# Patient Record
Sex: FEMALE | Race: WHITE | NOT HISPANIC OR LATINO | Employment: OTHER | URBAN - METROPOLITAN AREA
[De-identification: names, ages, dates, MRNs, and addresses within clinical notes are randomized per-mention and may not be internally consistent; named-entity substitution may affect disease eponyms.]

---

## 2017-01-14 ENCOUNTER — TRANSCRIBE ORDERS (OUTPATIENT)
Dept: ADMINISTRATIVE | Age: 72
End: 2017-01-14

## 2017-01-14 ENCOUNTER — APPOINTMENT (OUTPATIENT)
Dept: LAB | Age: 72
End: 2017-01-14
Payer: MEDICARE

## 2017-01-14 DIAGNOSIS — R94.5 NONSPECIFIC ABNORMAL RESULTS OF LIVER FUNCTION STUDY: Primary | ICD-10-CM

## 2017-01-14 DIAGNOSIS — R94.5 NONSPECIFIC ABNORMAL RESULTS OF LIVER FUNCTION STUDY: ICD-10-CM

## 2017-01-14 LAB
ALP SERPL-CCNC: 55 U/L (ref 46–116)
ALT SERPL W P-5'-P-CCNC: 24 U/L (ref 12–78)
AST SERPL W P-5'-P-CCNC: 17 U/L (ref 5–45)
GGT SERPL-CCNC: 16 U/L (ref 5–85)

## 2017-01-14 PROCEDURE — 84460 ALANINE AMINO (ALT) (SGPT): CPT

## 2017-01-14 PROCEDURE — 84075 ASSAY ALKALINE PHOSPHATASE: CPT

## 2017-01-14 PROCEDURE — 36415 COLL VENOUS BLD VENIPUNCTURE: CPT

## 2017-01-14 PROCEDURE — 82977 ASSAY OF GGT: CPT

## 2017-01-14 PROCEDURE — 84450 TRANSFERASE (AST) (SGOT): CPT

## 2017-04-25 ENCOUNTER — OFFICE VISIT (OUTPATIENT)
Dept: URGENT CARE | Facility: MEDICAL CENTER | Age: 72
End: 2017-04-25
Payer: MEDICARE

## 2017-04-25 PROCEDURE — 99203 OFFICE O/P NEW LOW 30 MIN: CPT

## 2017-04-25 PROCEDURE — G0463 HOSPITAL OUTPT CLINIC VISIT: HCPCS

## 2017-08-03 ENCOUNTER — TRANSCRIBE ORDERS (OUTPATIENT)
Dept: ADMINISTRATIVE | Age: 72
End: 2017-08-03

## 2017-08-03 ENCOUNTER — APPOINTMENT (OUTPATIENT)
Dept: LAB | Age: 72
End: 2017-08-03
Payer: MEDICARE

## 2017-08-03 DIAGNOSIS — R53.83 FATIGUE, UNSPECIFIED TYPE: Primary | ICD-10-CM

## 2017-08-03 DIAGNOSIS — M51.9 SCHMORL'S NODES, UNSPECIFIED REGION: ICD-10-CM

## 2017-08-03 DIAGNOSIS — E78.5 HYPERLIPIDEMIA, UNSPECIFIED HYPERLIPIDEMIA TYPE: ICD-10-CM

## 2017-08-03 DIAGNOSIS — G90.09 SPHENOPALATINE NEURALGIA: ICD-10-CM

## 2017-08-03 DIAGNOSIS — R94.5 NONSPECIFIC ABNORMAL RESULTS OF LIVER FUNCTION STUDY: ICD-10-CM

## 2017-08-03 DIAGNOSIS — E66.9 OBESITY, UNSPECIFIED: ICD-10-CM

## 2017-08-03 DIAGNOSIS — M12.9 ARTHROPATHY, UNSPECIFIED, SITE UNSPECIFIED: ICD-10-CM

## 2017-08-03 DIAGNOSIS — R53.83 FATIGUE, UNSPECIFIED TYPE: ICD-10-CM

## 2017-08-03 DIAGNOSIS — K57.32 DIVERTICULITIS OF COLON (WITHOUT MENTION OF HEMORRHAGE)(562.11): ICD-10-CM

## 2017-08-03 DIAGNOSIS — K21.9 GASTROESOPHAGEAL REFLUX DISEASE WITHOUT ESOPHAGITIS: ICD-10-CM

## 2017-08-03 DIAGNOSIS — N95.1 SYMPTOMATIC MENOPAUSAL OR FEMALE CLIMACTERIC STATES: ICD-10-CM

## 2017-08-03 LAB
ALBUMIN SERPL BCP-MCNC: 3.5 G/DL (ref 3.5–5)
ALP SERPL-CCNC: 54 U/L (ref 46–116)
ALT SERPL W P-5'-P-CCNC: 18 U/L (ref 12–78)
ANION GAP SERPL CALCULATED.3IONS-SCNC: 5 MMOL/L (ref 4–13)
AST SERPL W P-5'-P-CCNC: 23 U/L (ref 5–45)
BASOPHILS # BLD AUTO: 0.01 THOUSANDS/ΜL (ref 0–0.1)
BASOPHILS NFR BLD AUTO: 0 % (ref 0–1)
BILIRUB SERPL-MCNC: 0.63 MG/DL (ref 0.2–1)
BUN SERPL-MCNC: 11 MG/DL (ref 5–25)
CALCIUM SERPL-MCNC: 8.9 MG/DL (ref 8.3–10.1)
CHLORIDE SERPL-SCNC: 105 MMOL/L (ref 100–108)
CHOLEST SERPL-MCNC: 229 MG/DL (ref 50–200)
CO2 SERPL-SCNC: 29 MMOL/L (ref 21–32)
CREAT SERPL-MCNC: 0.67 MG/DL (ref 0.6–1.3)
EOSINOPHIL # BLD AUTO: 0.08 THOUSAND/ΜL (ref 0–0.61)
EOSINOPHIL NFR BLD AUTO: 1 % (ref 0–6)
ERYTHROCYTE [DISTWIDTH] IN BLOOD BY AUTOMATED COUNT: 13.8 % (ref 11.6–15.1)
GFR SERPL CREATININE-BSD FRML MDRD: 89 ML/MIN/1.73SQ M
GLUCOSE P FAST SERPL-MCNC: 85 MG/DL (ref 65–99)
HCT VFR BLD AUTO: 39.6 % (ref 34.8–46.1)
HDLC SERPL-MCNC: 75 MG/DL (ref 40–60)
HGB BLD-MCNC: 13.5 G/DL (ref 11.5–15.4)
LDLC SERPL CALC-MCNC: 132 MG/DL (ref 0–100)
LYMPHOCYTES # BLD AUTO: 1.23 THOUSANDS/ΜL (ref 0.6–4.47)
LYMPHOCYTES NFR BLD AUTO: 22 % (ref 14–44)
MCH RBC QN AUTO: 31.3 PG (ref 26.8–34.3)
MCHC RBC AUTO-ENTMCNC: 34.1 G/DL (ref 31.4–37.4)
MCV RBC AUTO: 92 FL (ref 82–98)
MONOCYTES # BLD AUTO: 0.58 THOUSAND/ΜL (ref 0.17–1.22)
MONOCYTES NFR BLD AUTO: 10 % (ref 4–12)
NEUTROPHILS # BLD AUTO: 3.68 THOUSANDS/ΜL (ref 1.85–7.62)
NEUTS SEG NFR BLD AUTO: 67 % (ref 43–75)
NRBC BLD AUTO-RTO: 0 /100 WBCS
PLATELET # BLD AUTO: 182 THOUSANDS/UL (ref 149–390)
PMV BLD AUTO: 12.5 FL (ref 8.9–12.7)
POTASSIUM SERPL-SCNC: 4.1 MMOL/L (ref 3.5–5.3)
PROT SERPL-MCNC: 6.5 G/DL (ref 6.4–8.2)
RBC # BLD AUTO: 4.32 MILLION/UL (ref 3.81–5.12)
SODIUM SERPL-SCNC: 139 MMOL/L (ref 136–145)
T4 FREE SERPL-MCNC: 0.91 NG/DL (ref 0.76–1.46)
TRIGL SERPL-MCNC: 108 MG/DL
TSH SERPL DL<=0.05 MIU/L-ACNC: 2.58 UIU/ML (ref 0.36–3.74)
WBC # BLD AUTO: 5.59 THOUSAND/UL (ref 4.31–10.16)

## 2017-08-03 PROCEDURE — 84443 ASSAY THYROID STIM HORMONE: CPT

## 2017-08-03 PROCEDURE — 80053 COMPREHEN METABOLIC PANEL: CPT

## 2017-08-03 PROCEDURE — 84439 ASSAY OF FREE THYROXINE: CPT

## 2017-08-03 PROCEDURE — 36415 COLL VENOUS BLD VENIPUNCTURE: CPT

## 2017-08-03 PROCEDURE — 80061 LIPID PANEL: CPT

## 2017-08-03 PROCEDURE — 85025 COMPLETE CBC W/AUTO DIFF WBC: CPT

## 2017-11-28 ENCOUNTER — GENERIC CONVERSION - ENCOUNTER (OUTPATIENT)
Dept: OTHER | Facility: OTHER | Age: 72
End: 2017-11-28

## 2017-11-28 DIAGNOSIS — Z13.820 ENCOUNTER FOR SCREENING FOR OSTEOPOROSIS: ICD-10-CM

## 2017-12-30 DIAGNOSIS — Z12.31 ENCOUNTER FOR SCREENING MAMMOGRAM FOR MALIGNANT NEOPLASM OF BREAST: ICD-10-CM

## 2018-01-12 NOTE — MISCELLANEOUS
Message   Recorded as Task   Date: 12/09/2016 03:21 PM, Created By: Cameron Jordan   Task Name: Care Coordination   Assigned To: Diomedes Vallejo   Regarding Patient: Darus Hodgkin, Status: Active   Comment:    Cameron Jordan - 09 Dec 2016 3:21 PM     TASK CREATED  Caller: Self; Care Coordination; [phone] (Home)  pt called for a mammo rx - KTM told her at her 10/2015 appt she didn't need an appt in 2016 [facility]) - just to call for an rx - she would like it mailed to her house  [address] [place] [address] Dec 2016 3:41 PM     TASK EDITED  order in and mailed to pt        Active Problems    1  Encounter for routine gynecological examination (V72 31) (Z01 419)   2  Encounter for screening mammogram for malignant neoplasm of breast (V76 12)   (Z12 31)   3  Postmenopausal disorder (627 9) (N95 9)    Current Meds   1  Estradiol 0 5 MG Oral Tablet; take 1 tablet by mouth every day; Therapy: 69TSD3707 to (Evaluate:30Nov2016)  Requested for: 06Utq0689; Last   Rx:05Pmz6587 Ordered   2  Estradiol 0 5 MG Oral Tablet; take 1 tablet by mouth every day; Therapy: 15Ugj5691 to (Evaluate:30Nov2016)  Requested for: 95Nyd6373; Last   Rx:75Pco3377 Ordered   3  Metoprolol Tartrate 25 MG Oral Tablet; Therapy: 56OQH1404 to (Last Rx:21Qyq3456)  Requested for: 37Bmg7255 Ordered    Allergies    1  No Known Drug Allergies    Plan  Encounter for screening mammogram for malignant neoplasm of breast    · * MAMMO SCREENING BILATERAL W CAD; Status:Hold For - Scheduling,Retrospective  By Protocol Authorization;  Requested for:[id number];     Signatures   Electronically signed by : Sandra Fiore, ; Dec  9 2016  3:41PM EST                       (Author)

## 2018-01-13 NOTE — MISCELLANEOUS
Message   Recorded as Task   Date: 03/30/2016 03:42 PM, Created By: Christy Boyd   Task Name: Miscellaneous   Assigned To: Diomedes Vallejo   Regarding Patient: Haylee Monday, Status: Active   CommentMarionjulisa Marchi - 30 Mar 2016 3:42 PM     TASK CREATED    pt needs us to call in at [phone] said they want medical necessity info on her Florence Asif - 30 Mar 2016 3:55 PM     TASK EDITED  estradial is approved per rociottivan at [facility], stu inman over        Active Problems    1  Encounter for routine gynecological examination (V72 31) (Z01 419)   2  Encounter for screening mammogram for malignant neoplasm of breast (V76 12)   (Z12 31)   3  Postmenopausal disorder (627 9) (N95 9)    Current Meds   1  Estradiol 0 5 MG Oral Tablet; take 1 tablet by mouth every day; Therapy: 56Ibt7440 to (Omar Ahumada)  Requested for: 82RTC6024; Last   Rx:96Pdb3690 Ordered   2  Metoprolol Tartrate 25 MG Oral Tablet; Therapy: 67TIV6335 to (Last Rx:25Nov2011)  Requested for: 25Nov2011 Ordered    Allergies    1   No Known Drug Allergies    Signatures   Electronically signed by : Augustina Serrato, ; Mar 30 2016  3:56PM EST                       (Author)

## 2018-01-22 VITALS
SYSTOLIC BLOOD PRESSURE: 144 MMHG | DIASTOLIC BLOOD PRESSURE: 86 MMHG | HEIGHT: 67 IN | WEIGHT: 184 LBS | BODY MASS INDEX: 28.88 KG/M2

## 2018-01-26 ENCOUNTER — HOSPITAL ENCOUNTER (OUTPATIENT)
Dept: RADIOLOGY | Age: 73
Discharge: HOME | End: 2018-01-26
Payer: MEDICARE

## 2018-01-26 DIAGNOSIS — Z12.31 ENCOUNTER FOR SCREENING MAMMOGRAM FOR MALIGNANT NEOPLASM OF BREAST: ICD-10-CM

## 2018-01-26 PROCEDURE — 77067 SCR MAMMO BI INCL CAD: CPT

## 2018-09-12 ENCOUNTER — TRANSCRIBE ORDERS (OUTPATIENT)
Dept: ADMINISTRATIVE | Age: 73
End: 2018-09-12

## 2018-09-12 ENCOUNTER — APPOINTMENT (OUTPATIENT)
Dept: LAB | Age: 73
End: 2018-09-12
Payer: MEDICARE

## 2018-09-12 DIAGNOSIS — R53.82 CHRONIC FATIGUE SYNDROME: ICD-10-CM

## 2018-09-12 DIAGNOSIS — E66.9 CLASS 1 OBESITY IN ADULT, UNSPECIFIED BMI, UNSPECIFIED OBESITY TYPE, UNSPECIFIED WHETHER SERIOUS COMORBIDITY PRESENT: ICD-10-CM

## 2018-09-12 DIAGNOSIS — I49.8 NODAL RHYTHM DISORDER: ICD-10-CM

## 2018-09-12 DIAGNOSIS — G90.09 SPHENOPALATINE NEURALGIA: ICD-10-CM

## 2018-09-12 DIAGNOSIS — R94.5 NONSPECIFIC ABNORMAL RESULTS OF LIVER FUNCTION STUDY: ICD-10-CM

## 2018-09-12 DIAGNOSIS — M51.9 INTERVERTEBRAL DISC DISORDER: ICD-10-CM

## 2018-09-12 DIAGNOSIS — N95.1 SYMPTOMATIC MENOPAUSAL OR FEMALE CLIMACTERIC STATES: ICD-10-CM

## 2018-09-12 DIAGNOSIS — E87.2 ACIDOSIS: ICD-10-CM

## 2018-09-12 DIAGNOSIS — E87.2 ACIDOSIS: Primary | ICD-10-CM

## 2018-09-12 LAB
ALBUMIN SERPL BCP-MCNC: 3.7 G/DL (ref 3.5–5)
ALP SERPL-CCNC: 66 U/L (ref 46–116)
ALT SERPL W P-5'-P-CCNC: 19 U/L (ref 12–78)
ANION GAP SERPL CALCULATED.3IONS-SCNC: 6 MMOL/L (ref 4–13)
AST SERPL W P-5'-P-CCNC: 18 U/L (ref 5–45)
BASOPHILS # BLD AUTO: 0.02 THOUSANDS/ΜL (ref 0–0.1)
BASOPHILS NFR BLD AUTO: 0 % (ref 0–1)
BILIRUB SERPL-MCNC: 0.62 MG/DL (ref 0.2–1)
BUN SERPL-MCNC: 10 MG/DL (ref 5–25)
CALCIUM SERPL-MCNC: 8.9 MG/DL (ref 8.3–10.1)
CHLORIDE SERPL-SCNC: 105 MMOL/L (ref 100–108)
CHOLEST SERPL-MCNC: 250 MG/DL (ref 50–200)
CO2 SERPL-SCNC: 27 MMOL/L (ref 21–32)
CREAT SERPL-MCNC: 0.68 MG/DL (ref 0.6–1.3)
EOSINOPHIL # BLD AUTO: 0.12 THOUSAND/ΜL (ref 0–0.61)
EOSINOPHIL NFR BLD AUTO: 2 % (ref 0–6)
ERYTHROCYTE [DISTWIDTH] IN BLOOD BY AUTOMATED COUNT: 13.3 % (ref 11.6–15.1)
GFR SERPL CREATININE-BSD FRML MDRD: 88 ML/MIN/1.73SQ M
GLUCOSE P FAST SERPL-MCNC: 89 MG/DL (ref 65–99)
HCT VFR BLD AUTO: 42.6 % (ref 34.8–46.1)
HDLC SERPL-MCNC: 71 MG/DL (ref 40–60)
HGB BLD-MCNC: 13.7 G/DL (ref 11.5–15.4)
IMM GRANULOCYTES # BLD AUTO: 0.01 THOUSAND/UL (ref 0–0.2)
IMM GRANULOCYTES NFR BLD AUTO: 0 % (ref 0–2)
LDLC SERPL CALC-MCNC: 162 MG/DL (ref 0–100)
LYMPHOCYTES # BLD AUTO: 1.32 THOUSANDS/ΜL (ref 0.6–4.47)
LYMPHOCYTES NFR BLD AUTO: 25 % (ref 14–44)
MCH RBC QN AUTO: 30.1 PG (ref 26.8–34.3)
MCHC RBC AUTO-ENTMCNC: 32.2 G/DL (ref 31.4–37.4)
MCV RBC AUTO: 94 FL (ref 82–98)
MONOCYTES # BLD AUTO: 0.66 THOUSAND/ΜL (ref 0.17–1.22)
MONOCYTES NFR BLD AUTO: 12 % (ref 4–12)
NEUTROPHILS # BLD AUTO: 3.22 THOUSANDS/ΜL (ref 1.85–7.62)
NEUTS SEG NFR BLD AUTO: 61 % (ref 43–75)
NONHDLC SERPL-MCNC: 179 MG/DL
NRBC BLD AUTO-RTO: 0 /100 WBCS
PLATELET # BLD AUTO: 170 THOUSANDS/UL (ref 149–390)
PMV BLD AUTO: 12.9 FL (ref 8.9–12.7)
POTASSIUM SERPL-SCNC: 4 MMOL/L (ref 3.5–5.3)
PROT SERPL-MCNC: 6.9 G/DL (ref 6.4–8.2)
RBC # BLD AUTO: 4.55 MILLION/UL (ref 3.81–5.12)
SODIUM SERPL-SCNC: 138 MMOL/L (ref 136–145)
T4 FREE SERPL-MCNC: 0.81 NG/DL (ref 0.76–1.46)
TRIGL SERPL-MCNC: 84 MG/DL
TSH SERPL DL<=0.05 MIU/L-ACNC: 3.04 UIU/ML (ref 0.36–3.74)
WBC # BLD AUTO: 5.35 THOUSAND/UL (ref 4.31–10.16)

## 2018-09-12 PROCEDURE — 80061 LIPID PANEL: CPT

## 2018-09-12 PROCEDURE — 84443 ASSAY THYROID STIM HORMONE: CPT

## 2018-09-12 PROCEDURE — 85025 COMPLETE CBC W/AUTO DIFF WBC: CPT

## 2018-09-12 PROCEDURE — 36415 COLL VENOUS BLD VENIPUNCTURE: CPT

## 2018-09-12 PROCEDURE — 84439 ASSAY OF FREE THYROXINE: CPT

## 2018-09-12 PROCEDURE — 80053 COMPREHEN METABOLIC PANEL: CPT

## 2018-12-03 ENCOUNTER — TRANSCRIBE ORDERS (OUTPATIENT)
Dept: ADMINISTRATIVE | Facility: HOSPITAL | Age: 73
End: 2018-12-03

## 2018-12-03 DIAGNOSIS — M25.562 ACUTE PAIN OF LEFT KNEE: Primary | ICD-10-CM

## 2018-12-04 DIAGNOSIS — Z78.0 MENOPAUSE: Primary | ICD-10-CM

## 2018-12-04 RX ORDER — ESTRADIOL 0.5 MG/1
0.5 TABLET ORAL DAILY
Qty: 90 TABLET | Refills: 1 | Status: SHIPPED | OUTPATIENT
Start: 2018-12-04 | End: 2019-08-20 | Stop reason: SDUPTHER

## 2018-12-04 NOTE — TELEPHONE ENCOUNTER
Pt called req refil on estradiol and new diego slip  Shira Ortiz  Apt not due until next year  Order placed in epic for diego and rx

## 2018-12-07 ENCOUNTER — HOSPITAL ENCOUNTER (OUTPATIENT)
Dept: RADIOLOGY | Age: 73
Discharge: HOME | End: 2018-12-07
Payer: MEDICARE

## 2018-12-07 DIAGNOSIS — M25.562 ACUTE PAIN OF LEFT KNEE: ICD-10-CM

## 2018-12-07 PROCEDURE — 73721 MRI JNT OF LWR EXTRE W/O DYE: CPT

## 2019-02-22 ENCOUNTER — HOSPITAL ENCOUNTER (OUTPATIENT)
Dept: RADIOLOGY | Age: 74
Discharge: HOME | End: 2019-02-22
Payer: MEDICARE

## 2019-02-22 VITALS — BODY MASS INDEX: 29.19 KG/M2 | HEIGHT: 67 IN | WEIGHT: 186 LBS

## 2019-02-22 DIAGNOSIS — Z78.0 MENOPAUSE: ICD-10-CM

## 2019-02-22 PROCEDURE — 77067 SCR MAMMO BI INCL CAD: CPT

## 2019-03-11 ENCOUNTER — TELEPHONE (OUTPATIENT)
Dept: OBGYN CLINIC | Facility: CLINIC | Age: 74
End: 2019-03-11

## 2019-08-20 DIAGNOSIS — Z78.0 MENOPAUSE: ICD-10-CM

## 2019-08-20 RX ORDER — ESTRADIOL 0.5 MG/1
0.5 TABLET ORAL DAILY
Qty: 90 TABLET | Refills: 1 | Status: SHIPPED | OUTPATIENT
Start: 2019-08-20 | End: 2020-03-18 | Stop reason: SDUPTHER

## 2019-08-30 ENCOUNTER — LAB (OUTPATIENT)
Dept: LAB | Facility: CLINIC | Age: 74
End: 2019-08-30
Payer: MEDICARE

## 2019-08-30 ENCOUNTER — TRANSCRIBE ORDERS (OUTPATIENT)
Dept: LAB | Facility: CLINIC | Age: 74
End: 2019-08-30

## 2019-08-30 DIAGNOSIS — M51.9 INTERVERTEBRAL DISC DISORDER: ICD-10-CM

## 2019-08-30 DIAGNOSIS — E87.2 ACIDOSIS: ICD-10-CM

## 2019-08-30 DIAGNOSIS — K21.9 GASTROESOPHAGEAL REFLUX DISEASE, ESOPHAGITIS PRESENCE NOT SPECIFIED: ICD-10-CM

## 2019-08-30 DIAGNOSIS — F01.50 VASCULAR DEMENTIA WITH DEPRESSED MOOD (HCC): ICD-10-CM

## 2019-08-30 DIAGNOSIS — I10 ESSENTIAL HYPERTENSION, MALIGNANT: ICD-10-CM

## 2019-08-30 DIAGNOSIS — I49.9 VENTRICULAR ARRHYTHMIA: ICD-10-CM

## 2019-08-30 DIAGNOSIS — R94.5 NONSPECIFIC ABNORMAL RESULTS OF LIVER FUNCTION STUDY: ICD-10-CM

## 2019-08-30 DIAGNOSIS — R53.83 OTHER FATIGUE: ICD-10-CM

## 2019-08-30 DIAGNOSIS — E66.9 OBESITY, UNSPECIFIED CLASSIFICATION, UNSPECIFIED OBESITY TYPE, UNSPECIFIED WHETHER SERIOUS COMORBIDITY PRESENT: ICD-10-CM

## 2019-08-30 DIAGNOSIS — E87.2 ACIDOSIS: Primary | ICD-10-CM

## 2019-08-30 DIAGNOSIS — F32.A VASCULAR DEMENTIA WITH DEPRESSED MOOD (HCC): ICD-10-CM

## 2019-08-30 DIAGNOSIS — R55 SYNCOPE AND COLLAPSE: ICD-10-CM

## 2019-08-30 DIAGNOSIS — G90.09 SPHENOPALATINE NEURALGIA: ICD-10-CM

## 2019-08-30 DIAGNOSIS — M12.9 ACUTE ARTHROPATHY: ICD-10-CM

## 2019-08-30 DIAGNOSIS — N95.1 SYMPTOMATIC MENOPAUSAL OR FEMALE CLIMACTERIC STATES: ICD-10-CM

## 2019-08-30 LAB
ALBUMIN SERPL BCP-MCNC: 3.8 G/DL (ref 3.5–5)
ALP SERPL-CCNC: 56 U/L (ref 46–116)
ALT SERPL W P-5'-P-CCNC: 27 U/L (ref 12–78)
ANION GAP SERPL CALCULATED.3IONS-SCNC: 6 MMOL/L (ref 4–13)
AST SERPL W P-5'-P-CCNC: 16 U/L (ref 5–45)
BASOPHILS # BLD AUTO: 0.01 THOUSANDS/ΜL (ref 0–0.1)
BASOPHILS NFR BLD AUTO: 0 % (ref 0–1)
BILIRUB SERPL-MCNC: 0.73 MG/DL (ref 0.2–1)
BUN SERPL-MCNC: 13 MG/DL (ref 5–25)
CALCIUM SERPL-MCNC: 8.9 MG/DL (ref 8.3–10.1)
CHLORIDE SERPL-SCNC: 105 MMOL/L (ref 100–108)
CHOLEST SERPL-MCNC: 275 MG/DL (ref 50–200)
CO2 SERPL-SCNC: 27 MMOL/L (ref 21–32)
CREAT SERPL-MCNC: 0.67 MG/DL (ref 0.6–1.3)
EOSINOPHIL # BLD AUTO: 0.09 THOUSAND/ΜL (ref 0–0.61)
EOSINOPHIL NFR BLD AUTO: 2 % (ref 0–6)
ERYTHROCYTE [DISTWIDTH] IN BLOOD BY AUTOMATED COUNT: 13.8 % (ref 11.6–15.1)
GFR SERPL CREATININE-BSD FRML MDRD: 88 ML/MIN/1.73SQ M
GLUCOSE P FAST SERPL-MCNC: 86 MG/DL (ref 65–99)
HCT VFR BLD AUTO: 42.1 % (ref 34.8–46.1)
HDLC SERPL-MCNC: 76 MG/DL (ref 40–60)
HGB BLD-MCNC: 13.6 G/DL (ref 11.5–15.4)
IMM GRANULOCYTES # BLD AUTO: 0.01 THOUSAND/UL (ref 0–0.2)
IMM GRANULOCYTES NFR BLD AUTO: 0 % (ref 0–2)
LDLC SERPL CALC-MCNC: 181 MG/DL (ref 0–100)
LYMPHOCYTES # BLD AUTO: 1.3 THOUSANDS/ΜL (ref 0.6–4.47)
LYMPHOCYTES NFR BLD AUTO: 30 % (ref 14–44)
MCH RBC QN AUTO: 30.2 PG (ref 26.8–34.3)
MCHC RBC AUTO-ENTMCNC: 32.3 G/DL (ref 31.4–37.4)
MCV RBC AUTO: 94 FL (ref 82–98)
MONOCYTES # BLD AUTO: 0.63 THOUSAND/ΜL (ref 0.17–1.22)
MONOCYTES NFR BLD AUTO: 14 % (ref 4–12)
NEUTROPHILS # BLD AUTO: 2.37 THOUSANDS/ΜL (ref 1.85–7.62)
NEUTS SEG NFR BLD AUTO: 54 % (ref 43–75)
NONHDLC SERPL-MCNC: 199 MG/DL
NRBC BLD AUTO-RTO: 0 /100 WBCS
PLATELET # BLD AUTO: 157 THOUSANDS/UL (ref 149–390)
PMV BLD AUTO: 12.8 FL (ref 8.9–12.7)
POTASSIUM SERPL-SCNC: 3.9 MMOL/L (ref 3.5–5.3)
PROT SERPL-MCNC: 7 G/DL (ref 6.4–8.2)
RBC # BLD AUTO: 4.5 MILLION/UL (ref 3.81–5.12)
SODIUM SERPL-SCNC: 138 MMOL/L (ref 136–145)
T4 FREE SERPL-MCNC: 0.81 NG/DL (ref 0.76–1.46)
TRIGL SERPL-MCNC: 89 MG/DL
TSH SERPL DL<=0.05 MIU/L-ACNC: 3.73 UIU/ML (ref 0.36–3.74)
WBC # BLD AUTO: 4.41 THOUSAND/UL (ref 4.31–10.16)

## 2019-08-30 PROCEDURE — 84443 ASSAY THYROID STIM HORMONE: CPT

## 2019-08-30 PROCEDURE — 84439 ASSAY OF FREE THYROXINE: CPT

## 2019-08-30 PROCEDURE — 80061 LIPID PANEL: CPT

## 2019-08-30 PROCEDURE — 80053 COMPREHEN METABOLIC PANEL: CPT

## 2019-08-30 PROCEDURE — 36415 COLL VENOUS BLD VENIPUNCTURE: CPT

## 2019-08-30 PROCEDURE — 85025 COMPLETE CBC W/AUTO DIFF WBC: CPT

## 2019-11-18 ENCOUNTER — TELEPHONE (OUTPATIENT)
Dept: OBGYN CLINIC | Facility: CLINIC | Age: 74
End: 2019-11-18

## 2019-11-18 DIAGNOSIS — Z12.31 SCREENING MAMMOGRAM, ENCOUNTER FOR: Primary | ICD-10-CM

## 2019-11-18 NOTE — TELEPHONE ENCOUNTER
Spoke with Pt today via phone call    Pt informed that radiology order for Mammo screening bilateral w cad was completed in Epic today, copy of said order mailed to Pt's mailing address per Pt's request

## 2020-02-27 ENCOUNTER — HOSPITAL ENCOUNTER (OUTPATIENT)
Dept: RADIOLOGY | Age: 75
Discharge: HOME | End: 2020-02-27
Payer: MEDICARE

## 2020-02-27 VITALS — HEIGHT: 67 IN | WEIGHT: 186 LBS | BODY MASS INDEX: 29.19 KG/M2

## 2020-02-27 DIAGNOSIS — Z12.31 SCREENING MAMMOGRAM, ENCOUNTER FOR: ICD-10-CM

## 2020-02-27 PROCEDURE — 77067 SCR MAMMO BI INCL CAD: CPT

## 2020-03-18 DIAGNOSIS — Z78.0 MENOPAUSE: ICD-10-CM

## 2020-03-18 RX ORDER — ESTRADIOL 0.5 MG/1
0.5 TABLET ORAL DAILY
Qty: 90 TABLET | Refills: 0 | Status: SHIPPED | OUTPATIENT
Start: 2020-03-18 | End: 2020-06-19 | Stop reason: SDUPTHER

## 2020-06-19 ENCOUNTER — ANNUAL EXAM (OUTPATIENT)
Dept: OBGYN CLINIC | Facility: CLINIC | Age: 75
End: 2020-06-19
Payer: MEDICARE

## 2020-06-19 VITALS
WEIGHT: 189 LBS | DIASTOLIC BLOOD PRESSURE: 80 MMHG | BODY MASS INDEX: 29.66 KG/M2 | SYSTOLIC BLOOD PRESSURE: 148 MMHG | HEIGHT: 67 IN

## 2020-06-19 DIAGNOSIS — Z01.419 ENCOUNTER FOR GYNECOLOGICAL EXAMINATION: Primary | ICD-10-CM

## 2020-06-19 DIAGNOSIS — Z12.11 ENCOUNTER FOR SCREENING COLONOSCOPY: ICD-10-CM

## 2020-06-19 DIAGNOSIS — Z78.0 MENOPAUSE: ICD-10-CM

## 2020-06-19 DIAGNOSIS — Z12.31 SCREENING MAMMOGRAM, ENCOUNTER FOR: ICD-10-CM

## 2020-06-19 PROCEDURE — G0101 CA SCREEN;PELVIC/BREAST EXAM: HCPCS | Performed by: OBSTETRICS & GYNECOLOGY

## 2020-06-19 RX ORDER — ESTRADIOL 0.5 MG/1
0.5 TABLET ORAL DAILY
Qty: 90 TABLET | Refills: 3 | Status: SHIPPED | OUTPATIENT
Start: 2020-06-19 | End: 2021-06-02

## 2020-06-19 RX ORDER — METOPROLOL TARTRATE 50 MG/1
50 TABLET, FILM COATED ORAL EVERY 12 HOURS SCHEDULED
COMMUNITY

## 2020-09-08 ENCOUNTER — EVALUATION (OUTPATIENT)
Dept: OCCUPATIONAL THERAPY | Facility: MEDICAL CENTER | Age: 75
End: 2020-09-08
Payer: MEDICARE

## 2020-09-08 ENCOUNTER — TRANSCRIBE ORDERS (OUTPATIENT)
Dept: PHYSICAL THERAPY | Facility: MEDICAL CENTER | Age: 75
End: 2020-09-08

## 2020-09-08 DIAGNOSIS — M65.342 TRIGGER RING FINGER OF LEFT HAND: Primary | ICD-10-CM

## 2020-09-08 PROCEDURE — 97165 OT EVAL LOW COMPLEX 30 MIN: CPT

## 2020-09-08 PROCEDURE — 97140 MANUAL THERAPY 1/> REGIONS: CPT

## 2020-09-08 NOTE — LETTER
2020    Salvatore Marc DO  [address] [facility]    Patient: Eden Soriano   YOB: 1945   Date of Visit: 2020     Encounter Diagnosis     ICD-10-CM    1  Trigger ring finger of left hand  M65 342        Dear Dr Jaziel Denny:    Thank you for your recent referral of Eden Soriano  Please review the attached evaluation summary from Yumiko's recent visit  Please verify that you agree with the plan of care by signing the attached order  If you have any questions or concerns, please do not hesitate to call  I sincerely appreciate the opportunity to share in the care of one of your patients and hope to have another opportunity to work with you in the near future  Sincerely,    Navneet Gillis, OT      Referring Provider:     I certify that I have read the below Plan of Care and certify the need for these services furnished under this plan of treatment while under my care  Salvatore Marc DO  [address] [place] [address] Street: [phone]        OT Evaluation     Today's date: 2020  Patient name: Eden Soriano  : 1945  MRN: [id number]  Referring provider: Jacinta Squires DO  Dx:   Encounter Diagnosis     ICD-10-CM    1  Trigger ring finger of left hand  M65 342                   Assessment  Assessment details: Carlotta Kidd presents to therapy with slightly decreased ROM as well as a soft tissue contracture on her R hand as well as nodules on b/l hands consistent with her dx of trigger finger  She would benefit from therapy to increase ROM and strength  Impairments: abnormal or restricted ROM, impaired balance, lacks appropriate home exercise program and pain with function    Goals  STG 1: Increase Digit AROM to [facility] in 4-6 weeks  STG 2: Decrease overall pain to 2/10 in 4-6 weeks  STG 3: Increase overall strength by 25% in 4-6 weeks  STG 4: Compliant with HEP in 2 weeks      LTG 1: Complete all ADL/IADLs improved to prior level of function within 6-8 weeks  LTG 2: Leisure/social skills improved within 6-8 weeks  LTG 3: Work skills improved to prior level of function within 6-8 weeks    Patient Goal: To peel potatoes! Goals, plan of care and treatment condition discussed with patient  Patient expresses their understanding and questions regarding these isues were addressed  Plan  Patient would benefit from: custom splinting, OT eval and skilled occupational therapy  Planned modality interventions: thermotherapy: hydrocollator packs  Planned therapy interventions: home exercise program, graded activity, graded exercise, functional ROM exercises, therapeutic activities, therapeutic exercise, Montiel taping, massage and joint mobilization  Frequency: 2x week  Treatment plan discussed with: patient        Subjective Evaluation    History of Present Illness  Onset date: A few months  Mechanism of injury: RF on L, LF on R  Pain  Current pain ratin  At best pain ratin  At worst pain rating: 3  Quality: dull ache    Hand dominance: left          Objective     Observations     Additional Observation Details  Soft tissue contracture in line with R RF consistent with possible Dupuytren's contracture  Nodule at A1 pulley of R LF and L RF    Neurological Testing     Sensation     Wrist/Hand   Left   Intact: light touch    Right   Intact: light touch    Active Range of Motion     Right Digits   Flexion   Middle     MCP: 70    PIP: 90    DIP: 40    Additional Active Range of Motion Details  WFL Fist of L    Strength/Myotome Testing     Left Wrist/Hand      (2nd hand position)     Trial 1: 28 9    Right Wrist/Hand      (2nd hand position)     Trial 1: 29 8    Tests     Right Elbow   Negative Tinel's sign (cubital tunnel)  Right Wrist/Hand   Negative Tinel's sign (medial nerve)         Flowsheet Rows      Most Recent Value   PT/OT G-Codes   Current Score  67   Projected Score  73 Precautions: Universal      Manuals 9/8            STM 10'            [facility]                                       Neuro Re-Ed                                                                                                        Ther Ex             Active Ext             Intrinsic Rolll             Hook first into putty             PIP Isometric                                                    HEP: PIP isometric, active extension, hook fist Reviewed            Ther Activity                                       Gait Training                                       Modalities             [facility] 5'

## 2020-09-08 NOTE — PROGRESS NOTES
OT Evaluation     Today's date: 2020  Patient name: Sarika Yuan  : 1945  MRN: [id number]  Referring provider: Williams Cabrales DO  Dx:   Encounter Diagnosis     ICD-10-CM    1  Trigger ring finger of left hand  M65 342                   Assessment  Assessment details: Nery Oleary presents to therapy with slightly decreased ROM as well as a soft tissue contracture on her R hand as well as nodules on b/l hands consistent with her dx of trigger finger  She would benefit from therapy to increase ROM and strength  Impairments: abnormal or restricted ROM, impaired balance, lacks appropriate home exercise program and pain with function    Goals  STG 1: [facility] in 4-6 weeks  STG 2: Decrease overall pain to 2/10 in 4-6 weeks  STG 3: Increase overall strength by 25% in 4-6 weeks  STG 4: Compliant with HEP in 2 weeks  LTG 1: Complete all ADL/IADLs improved to prior level of function within 6-8 weeks  LTG 2: Leisure/social skills improved within 6-8 weeks  LTG 3: Work skills improved to prior level of function within 6-8 weeks    Patient Goal: To peel potatoes! Goals, plan of care and treatment condition discussed with patient  Patient expresses their understanding and questions regarding these isues were addressed  Plan  Patient would benefit from: custom splinting, OT eval and skilled occupational therapy  Planned modality interventions: thermotherapy: hydrocollator packs  Planned therapy interventions: home exercise program, graded activity, graded exercise, functional ROM exercises, therapeutic activities, therapeutic exercise, Montiel taping, massage and joint mobilization  Frequency: 2x week  Treatment plan discussed with: patient        Subjective Evaluation    History of Present Illness  Onset date: A few months    Mechanism of injury: RF on L, LF on R  Pain  Current pain ratin  At best pain ratin  At worst pain rating: 3  Quality: dull ache    Hand dominance: left          Objective     Observations     Additional Observation Details  Soft tissue contracture in line with R RF consistent with possible Dupuytren's contracture  Nodule at A1 pulley of R LF and L RF    Neurological Testing     Sensation     Wrist/Hand   Left   Intact: light touch    Right   Intact: light touch    Active Range of Motion     Right Digits   Flexion   Middle     MCP: 70    PIP: 90    DIP: 40    Additional Active Range of Motion Details  WFL Fist of L    Strength/Myotome Testing     Left Wrist/Hand      (2nd hand position)     Trial 1: 28 9    Right Wrist/Hand      (2nd hand position)     Trial 1: 29 8    Tests     Right Elbow   Negative Tinel's sign (cubital tunnel)  Right Wrist/Hand   Negative Tinel's sign (medial nerve)         Flowsheet Rows      Most Recent Value   PT/OT G-Codes   Current Score  67   Projected Score  73             Precautions: Universal      Manuals 9/8            STM 10'            IASTM                                       Neuro Re-Ed                                                                                                        Ther Ex             Active Ext             Intrinsic Rolll             Hook first into putty             PIP Isometric                                                    HEP: PIP isometric, active extension, hook fist Reviewed            Ther Activity                                       Gait Training                                       Modalities             [facility] 5'

## 2020-09-16 ENCOUNTER — OFFICE VISIT (OUTPATIENT)
Dept: OCCUPATIONAL THERAPY | Facility: MEDICAL CENTER | Age: 75
End: 2020-09-16
Payer: MEDICARE

## 2020-09-16 DIAGNOSIS — M65.342 TRIGGER RING FINGER OF LEFT HAND: Primary | ICD-10-CM

## 2020-09-16 PROCEDURE — 97110 THERAPEUTIC EXERCISES: CPT

## 2020-09-16 PROCEDURE — 97140 MANUAL THERAPY 1/> REGIONS: CPT

## 2020-09-16 NOTE — PROGRESS NOTES
Daily Note     Today's date: 2020  Patient name: Kirke Aase  : 1945  MRN: [id number]  Referring provider: Jane Ribera DO  Dx:   Encounter Diagnosis     ICD-10-CM    1  Trigger ring finger of left hand  M65 342                   Subjective: This one (LRF) isn't triggering as much  Objective: See treatment diary below      Assessment: Tolerated treatment well  Patient would benefit from continued OT  Moderate swelling RLF, and some triggering at times  LRF triggering less  Pt had full PROM LRF after heat and stretch  Plan: Continue per plan of care        Precautions: Universal      Manuals            STM 10'            IASTM  10'                                     Neuro Re-Ed                                                                                                        Ther Ex             Active Ext  blocked PIP ext           Intrinsic Rolll  2x10           Hook first into putty  orange putty 10x           PIP Isometric  5sec hold 10x                                                  HEP: PIP isometric, active extension, hook fist Reviewed            Ther Activity                                                                              Modalities             [facility] 5' 5'

## 2020-09-21 ENCOUNTER — OFFICE VISIT (OUTPATIENT)
Dept: OCCUPATIONAL THERAPY | Facility: MEDICAL CENTER | Age: 75
End: 2020-09-21
Payer: MEDICARE

## 2020-09-21 DIAGNOSIS — M65.342 TRIGGER RING FINGER OF LEFT HAND: Primary | ICD-10-CM

## 2020-09-21 PROCEDURE — 97140 MANUAL THERAPY 1/> REGIONS: CPT

## 2020-09-21 PROCEDURE — 97110 THERAPEUTIC EXERCISES: CPT

## 2020-09-21 NOTE — PROGRESS NOTES
Daily Note     Today's date: 2020  Patient name: Rudy Vargas  : 1945  MRN: [id number]  Referring provider: Jax Dougherty DO  Dx:   Encounter Diagnosis     ICD-10-CM    1  Trigger ring finger of left hand  M65 342                   Subjective: They're feeling ok, pretty much the same but right one is bugging me  Objective: See treatment diary below      Assessment: Tolerated treatment well  Patient would benefit from continued OT  Still triggering in R   LRF triggering less with good PROM  Splint was made for R to wear at night  Tolerated exercises well but needed mod VC to ensure proper form  Plan: Continue per plan of care        Precautions: Universal      Manuals           STM 10'            IASTM  10' 10'                                    Neuro Re-Ed                                                                                                        Ther Ex             Active Ext  blocked PIP ext Blocked PIP ext          Intrinsic Rolll  2x10 2x10          Hook first into putty  orange putty 10x orange putty 20x          PIP Isometric  5sec hold 10x 5sec hold 10x                                                 HEP: PIP isometric, active extension, hook fist Reviewed            Ther Activity                                                                              Modalities             [facility] 5' 5' 5'

## 2020-09-22 ENCOUNTER — APPOINTMENT (OUTPATIENT)
Dept: LAB | Facility: CLINIC | Age: 75
End: 2020-09-22
Payer: MEDICARE

## 2020-09-22 ENCOUNTER — TRANSCRIBE ORDERS (OUTPATIENT)
Dept: LAB | Facility: CLINIC | Age: 75
End: 2020-09-22

## 2020-09-22 DIAGNOSIS — R53.83 OTHER FATIGUE: ICD-10-CM

## 2020-09-22 DIAGNOSIS — I49.9 VENTRICULAR ARRHYTHMIA: ICD-10-CM

## 2020-09-22 DIAGNOSIS — E78.2 MIXED HYPERLIPIDEMIA: Primary | ICD-10-CM

## 2020-09-22 DIAGNOSIS — E78.2 MIXED HYPERLIPIDEMIA: ICD-10-CM

## 2020-09-22 LAB
ALBUMIN SERPL BCP-MCNC: 3.9 G/DL (ref 3.5–5)
ALP SERPL-CCNC: 58 U/L (ref 46–116)
ALT SERPL W P-5'-P-CCNC: 24 U/L (ref 12–78)
ANION GAP SERPL CALCULATED.3IONS-SCNC: 3 MMOL/L (ref 4–13)
AST SERPL W P-5'-P-CCNC: 18 U/L (ref 5–45)
BASOPHILS # BLD AUTO: 0.02 THOUSANDS/ΜL (ref 0–0.1)
BASOPHILS NFR BLD AUTO: 0 % (ref 0–1)
BILIRUB SERPL-MCNC: 0.44 MG/DL (ref 0.2–1)
BUN SERPL-MCNC: 14 MG/DL (ref 5–25)
CALCIUM SERPL-MCNC: 9.1 MG/DL (ref 8.3–10.1)
CHLORIDE SERPL-SCNC: 107 MMOL/L (ref 100–108)
CHOLEST SERPL-MCNC: 269 MG/DL (ref 50–200)
CO2 SERPL-SCNC: 29 MMOL/L (ref 21–32)
CREAT SERPL-MCNC: 0.75 MG/DL (ref 0.6–1.3)
EOSINOPHIL # BLD AUTO: 0.1 THOUSAND/ΜL (ref 0–0.61)
EOSINOPHIL NFR BLD AUTO: 2 % (ref 0–6)
ERYTHROCYTE [DISTWIDTH] IN BLOOD BY AUTOMATED COUNT: 13.7 % (ref 11.6–15.1)
GFR SERPL CREATININE-BSD FRML MDRD: 79 ML/MIN/1.73SQ M
GLUCOSE P FAST SERPL-MCNC: 83 MG/DL (ref 65–99)
HCT VFR BLD AUTO: 42.4 % (ref 34.8–46.1)
HDLC SERPL-MCNC: 67 MG/DL
HGB BLD-MCNC: 13.5 G/DL (ref 11.5–15.4)
IMM GRANULOCYTES # BLD AUTO: 0.01 THOUSAND/UL (ref 0–0.2)
IMM GRANULOCYTES NFR BLD AUTO: 0 % (ref 0–2)
LDLC SERPL CALC-MCNC: 178 MG/DL (ref 0–100)
LYMPHOCYTES # BLD AUTO: 1.37 THOUSANDS/ΜL (ref 0.6–4.47)
LYMPHOCYTES NFR BLD AUTO: 27 % (ref 14–44)
MCH RBC QN AUTO: 30.6 PG (ref 26.8–34.3)
MCHC RBC AUTO-ENTMCNC: 31.8 G/DL (ref 31.4–37.4)
MCV RBC AUTO: 96 FL (ref 82–98)
MONOCYTES # BLD AUTO: 0.63 THOUSAND/ΜL (ref 0.17–1.22)
MONOCYTES NFR BLD AUTO: 12 % (ref 4–12)
NEUTROPHILS # BLD AUTO: 3 THOUSANDS/ΜL (ref 1.85–7.62)
NEUTS SEG NFR BLD AUTO: 59 % (ref 43–75)
NONHDLC SERPL-MCNC: 202 MG/DL
NRBC BLD AUTO-RTO: 0 /100 WBCS
PLATELET # BLD AUTO: 171 THOUSANDS/UL (ref 149–390)
PMV BLD AUTO: 13.4 FL (ref 8.9–12.7)
POTASSIUM SERPL-SCNC: 4 MMOL/L (ref 3.5–5.3)
PROT SERPL-MCNC: 7.1 G/DL (ref 6.4–8.2)
RBC # BLD AUTO: 4.41 MILLION/UL (ref 3.81–5.12)
SODIUM SERPL-SCNC: 139 MMOL/L (ref 136–145)
T4 FREE SERPL-MCNC: 0.89 NG/DL (ref 0.76–1.46)
TRIGL SERPL-MCNC: 121 MG/DL
TSH SERPL DL<=0.05 MIU/L-ACNC: 5.36 UIU/ML (ref 0.36–3.74)
WBC # BLD AUTO: 5.13 THOUSAND/UL (ref 4.31–10.16)

## 2020-09-22 PROCEDURE — 80061 LIPID PANEL: CPT

## 2020-09-22 PROCEDURE — 85025 COMPLETE CBC W/AUTO DIFF WBC: CPT

## 2020-09-22 PROCEDURE — 84439 ASSAY OF FREE THYROXINE: CPT

## 2020-09-22 PROCEDURE — 84443 ASSAY THYROID STIM HORMONE: CPT

## 2020-09-22 PROCEDURE — 80053 COMPREHEN METABOLIC PANEL: CPT

## 2020-09-22 PROCEDURE — 36415 COLL VENOUS BLD VENIPUNCTURE: CPT

## 2020-09-23 ENCOUNTER — APPOINTMENT (OUTPATIENT)
Dept: OCCUPATIONAL THERAPY | Facility: MEDICAL CENTER | Age: 75
End: 2020-09-23
Payer: MEDICARE

## 2020-09-28 ENCOUNTER — OFFICE VISIT (OUTPATIENT)
Dept: OCCUPATIONAL THERAPY | Facility: MEDICAL CENTER | Age: 75
End: 2020-09-28
Payer: MEDICARE

## 2020-09-28 DIAGNOSIS — M65.342 TRIGGER RING FINGER OF LEFT HAND: Primary | ICD-10-CM

## 2020-09-28 PROCEDURE — 97110 THERAPEUTIC EXERCISES: CPT

## 2020-09-28 PROCEDURE — 97140 MANUAL THERAPY 1/> REGIONS: CPT

## 2020-09-28 NOTE — PROGRESS NOTES
Daily Note     Today's date: 2020  Patient name: Nuria Lamb  : 1945  MRN: [id number]  Referring provider: Mona Abbott DO  Dx:   Encounter Diagnosis     ICD-10-CM    1  Trigger ring finger of left hand  M65 342                   Subjective: The L is great, R is doing better too      Objective: See treatment diary below      Assessment: Tolerated treatment well  Patient would benefit from continued OT  L no longer triggering, continues to report "fat" feeling  R triggering, however she has full ROM  Continue IASTM with both    Plan: Continue per plan of care        Precautions: Universal      Manuals          STM 10'            IASTM  10' 10' 15'                                   Neuro Re-Ed                                                                                                        Ther Ex             Active Ext  blocked PIP ext Blocked PIP ext Jar 1x         Intrinsic Rolll  2x10 2x10 2x10         Hook first into putty  orange putty 10x orange putty 20x Into Y&R mix, 2x10         PIP Isometric  5sec hold 10x 5sec hold 10x 5 sec hold 10x                                                HEP: PIP isometric, active extension, hook fist Reviewed            Ther Activity                                                                              Modalities             [facility] 5' 5' 5'

## 2020-09-30 ENCOUNTER — APPOINTMENT (OUTPATIENT)
Dept: OCCUPATIONAL THERAPY | Facility: MEDICAL CENTER | Age: 75
End: 2020-09-30
Payer: MEDICARE

## 2020-10-05 ENCOUNTER — OFFICE VISIT (OUTPATIENT)
Dept: OCCUPATIONAL THERAPY | Facility: MEDICAL CENTER | Age: 75
End: 2020-10-05
Payer: MEDICARE

## 2020-10-05 DIAGNOSIS — M65.342 TRIGGER RING FINGER OF LEFT HAND: Primary | ICD-10-CM

## 2020-10-05 PROCEDURE — 97110 THERAPEUTIC EXERCISES: CPT

## 2020-10-05 PROCEDURE — 97140 MANUAL THERAPY 1/> REGIONS: CPT

## 2020-10-07 ENCOUNTER — EVALUATION (OUTPATIENT)
Dept: OCCUPATIONAL THERAPY | Facility: MEDICAL CENTER | Age: 75
End: 2020-10-07
Payer: MEDICARE

## 2020-10-07 ENCOUNTER — TRANSCRIBE ORDERS (OUTPATIENT)
Dept: PHYSICAL THERAPY | Facility: MEDICAL CENTER | Age: 75
End: 2020-10-07

## 2020-10-07 DIAGNOSIS — M65.342 TRIGGER RING FINGER OF LEFT HAND: Primary | ICD-10-CM

## 2020-10-07 PROCEDURE — 97140 MANUAL THERAPY 1/> REGIONS: CPT

## 2020-10-07 PROCEDURE — 97110 THERAPEUTIC EXERCISES: CPT

## 2020-10-15 ENCOUNTER — OFFICE VISIT (OUTPATIENT)
Dept: OCCUPATIONAL THERAPY | Facility: MEDICAL CENTER | Age: 75
End: 2020-10-15
Payer: MEDICARE

## 2020-10-15 DIAGNOSIS — M65.342 TRIGGER RING FINGER OF LEFT HAND: Primary | ICD-10-CM

## 2020-10-15 PROCEDURE — 97140 MANUAL THERAPY 1/> REGIONS: CPT

## 2020-10-15 PROCEDURE — 97110 THERAPEUTIC EXERCISES: CPT

## 2020-10-22 ENCOUNTER — OFFICE VISIT (OUTPATIENT)
Dept: OCCUPATIONAL THERAPY | Facility: MEDICAL CENTER | Age: 75
End: 2020-10-22
Payer: MEDICARE

## 2020-10-22 DIAGNOSIS — M65.342 TRIGGER RING FINGER OF LEFT HAND: Primary | ICD-10-CM

## 2020-10-22 PROCEDURE — 97140 MANUAL THERAPY 1/> REGIONS: CPT

## 2020-11-09 ENCOUNTER — LAB (OUTPATIENT)
Dept: LAB | Facility: CLINIC | Age: 75
End: 2020-11-09
Payer: MEDICARE

## 2020-11-09 ENCOUNTER — TRANSCRIBE ORDERS (OUTPATIENT)
Dept: LAB | Facility: CLINIC | Age: 75
End: 2020-11-09

## 2020-11-09 DIAGNOSIS — Z11.59 SCREENING EXAMINATION FOR POLIOMYELITIS: Primary | ICD-10-CM

## 2020-11-09 DIAGNOSIS — Z11.59 SCREENING EXAMINATION FOR POLIOMYELITIS: ICD-10-CM

## 2020-11-09 PROCEDURE — 87522 HEPATITIS C REVRS TRNSCRPJ: CPT

## 2020-11-09 PROCEDURE — 86235 NUCLEAR ANTIGEN ANTIBODY: CPT

## 2020-11-09 PROCEDURE — 36415 COLL VENOUS BLD VENIPUNCTURE: CPT

## 2020-11-09 PROCEDURE — 86038 ANTINUCLEAR ANTIBODIES: CPT

## 2020-11-10 LAB
ENA SS-A AB SER-ACNC: <0.2 AI (ref 0–0.9)
ENA SS-B AB SER-ACNC: <0.2 AI (ref 0–0.9)

## 2020-11-11 LAB — RYE IGE QN: NEGATIVE

## 2020-11-12 LAB
HCV RNA SERPL NAA+PROBE-ACNC: NORMAL IU/ML
TEST INFORMATION: NORMAL

## 2020-11-13 LAB
HCV RNA SERPL NAA+PROBE-ACNC: NORMAL IU/ML
TEST INFORMATION: NORMAL

## 2020-12-18 ENCOUNTER — TRANSCRIBE ORDERS (OUTPATIENT)
Dept: LAB | Facility: CLINIC | Age: 75
End: 2020-12-18

## 2020-12-18 ENCOUNTER — LAB (OUTPATIENT)
Dept: LAB | Facility: CLINIC | Age: 75
End: 2020-12-18
Payer: MEDICARE

## 2020-12-18 DIAGNOSIS — R19.7 DIARRHEA OF PRESUMED INFECTIOUS ORIGIN: ICD-10-CM

## 2020-12-18 DIAGNOSIS — R19.7 DIARRHEA OF PRESUMED INFECTIOUS ORIGIN: Primary | ICD-10-CM

## 2020-12-18 PROCEDURE — 83516 IMMUNOASSAY NONANTIBODY: CPT

## 2020-12-18 PROCEDURE — 82784 ASSAY IGA/IGD/IGG/IGM EACH: CPT

## 2020-12-18 PROCEDURE — 86255 FLUORESCENT ANTIBODY SCREEN: CPT

## 2020-12-18 PROCEDURE — 36415 COLL VENOUS BLD VENIPUNCTURE: CPT

## 2020-12-19 LAB
ENDOMYSIUM IGA SER QL: NEGATIVE
GLIADIN PEPTIDE IGA SER-ACNC: 3 UNITS (ref 0–19)
GLIADIN PEPTIDE IGG SER-ACNC: 3 UNITS (ref 0–19)
IGA SERPL-MCNC: 198 MG/DL (ref 64–422)
TTG IGA SER-ACNC: <2 U/ML (ref 0–3)
TTG IGG SER-ACNC: <2 U/ML (ref 0–5)

## 2020-12-26 ENCOUNTER — PREP FOR PROCEDURE (OUTPATIENT)
Dept: GASTROENTEROLOGY | Facility: CLINIC | Age: 75
End: 2020-12-26

## 2020-12-26 DIAGNOSIS — K62.5 RECTAL BLEEDING: ICD-10-CM

## 2020-12-26 DIAGNOSIS — R19.4 CHANGE IN BOWEL HABITS: Primary | ICD-10-CM

## 2021-01-18 ENCOUNTER — TELEPHONE (OUTPATIENT)
Dept: GASTROENTEROLOGY | Facility: CLINIC | Age: 76
End: 2021-01-18

## 2021-01-18 NOTE — TELEPHONE ENCOUNTER
Pt called informing that she has reflux and occasional dysphagia  She is asking if an EGD been added to her Colon this Friday? Thank you!

## 2021-01-20 ENCOUNTER — TELEPHONE (OUTPATIENT)
Dept: GASTROENTEROLOGY | Facility: CLINIC | Age: 76
End: 2021-01-20

## 2021-01-20 NOTE — TELEPHONE ENCOUNTER
Per Dr Td Maxwell can add EGD to pt's Colon scheduled this Friday 1/22/21  I added EGD to colon on Friday  Called pt and informed her of this  Karena Mail, please do benefit verification for the EGD  Thank you!

## 2021-01-21 RX ORDER — MULTIVITAMIN WITH IRON
TABLET ORAL
COMMUNITY

## 2021-01-21 RX ORDER — MULTIVITAMIN WITH IRON
100 TABLET ORAL DAILY
COMMUNITY

## 2021-01-22 ENCOUNTER — ANESTHESIA (OUTPATIENT)
Dept: GASTROENTEROLOGY | Facility: AMBULATORY SURGERY CENTER | Age: 76
End: 2021-01-22
Payer: MEDICARE

## 2021-01-22 ENCOUNTER — ANESTHESIA EVENT (OUTPATIENT)
Dept: GASTROENTEROLOGY | Facility: AMBULATORY SURGERY CENTER | Age: 76
End: 2021-01-22

## 2021-01-22 ENCOUNTER — HOSPITAL ENCOUNTER (OUTPATIENT)
Dept: GASTROENTEROLOGY | Facility: AMBULATORY SURGERY CENTER | Age: 76
Discharge: HOME | End: 2021-01-22
Payer: MEDICARE

## 2021-01-22 VITALS
RESPIRATION RATE: 18 BRPM | HEART RATE: 57 BPM | TEMPERATURE: 96 F | SYSTOLIC BLOOD PRESSURE: 139 MMHG | DIASTOLIC BLOOD PRESSURE: 66 MMHG | OXYGEN SATURATION: 100 %

## 2021-01-22 DIAGNOSIS — K62.5 RECTAL BLEEDING: ICD-10-CM

## 2021-01-22 DIAGNOSIS — K21.9 GASTROESOPHAGEAL REFLUX DISEASE WITHOUT ESOPHAGITIS: ICD-10-CM

## 2021-01-22 DIAGNOSIS — R13.10 DYSPHAGIA, UNSPECIFIED TYPE: ICD-10-CM

## 2021-01-22 DIAGNOSIS — R19.4 CHANGE IN BOWEL HABITS: ICD-10-CM

## 2021-01-22 PROBLEM — K64.1 GRADE II INTERNAL HEMORRHOIDS: Status: ACTIVE | Noted: 2021-01-22

## 2021-01-22 PROBLEM — R13.19 ESOPHAGEAL DYSPHAGIA: Status: ACTIVE | Noted: 2021-01-22

## 2021-01-22 PROCEDURE — 88305 TISSUE EXAM BY PATHOLOGIST: CPT | Performed by: PATHOLOGY

## 2021-01-22 PROCEDURE — 99100 ANES PT EXTEME AGE<1 YR&>70: CPT | Performed by: NURSE ANESTHETIST, CERTIFIED REGISTERED

## 2021-01-22 PROCEDURE — 43239 EGD BIOPSY SINGLE/MULTIPLE: CPT | Performed by: INTERNAL MEDICINE

## 2021-01-22 PROCEDURE — 45380 COLONOSCOPY AND BIOPSY: CPT | Performed by: INTERNAL MEDICINE

## 2021-01-22 PROCEDURE — 88342 IMHCHEM/IMCYTCHM 1ST ANTB: CPT | Performed by: PATHOLOGY

## 2021-01-22 PROCEDURE — 88313 SPECIAL STAINS GROUP 2: CPT | Performed by: PATHOLOGY

## 2021-01-22 PROCEDURE — 88341 IMHCHEM/IMCYTCHM EA ADD ANTB: CPT | Performed by: PATHOLOGY

## 2021-01-22 PROCEDURE — 00813 ANES UPR LWR GI NDSC PX: CPT | Performed by: NURSE ANESTHETIST, CERTIFIED REGISTERED

## 2021-01-22 RX ORDER — FAMOTIDINE 40 MG/1
40 TABLET, FILM COATED ORAL DAILY
Qty: 30 TABLET | Refills: 1 | Status: SHIPPED | OUTPATIENT
Start: 2021-01-22 | End: 2021-04-09

## 2021-01-22 RX ORDER — SODIUM CHLORIDE 9 MG/ML
30 INJECTION, SOLUTION INTRAVENOUS CONTINUOUS
Status: DISCONTINUED | OUTPATIENT
Start: 2021-01-22 | End: 2021-01-26 | Stop reason: HOSPADM

## 2021-01-22 RX ORDER — SODIUM CHLORIDE 9 MG/ML
20 INJECTION, SOLUTION INTRAVENOUS CONTINUOUS
Status: DISCONTINUED | OUTPATIENT
Start: 2021-01-22 | End: 2021-01-26 | Stop reason: HOSPADM

## 2021-01-22 RX ORDER — PROPOFOL 10 MG/ML
INJECTION, EMULSION INTRAVENOUS AS NEEDED
Status: DISCONTINUED | OUTPATIENT
Start: 2021-01-22 | End: 2021-01-22

## 2021-01-22 RX ADMIN — PROPOFOL 50 MG: 10 INJECTION, EMULSION INTRAVENOUS at 11:29

## 2021-01-22 RX ADMIN — PROPOFOL 50 MG: 10 INJECTION, EMULSION INTRAVENOUS at 11:11

## 2021-01-22 RX ADMIN — PROPOFOL 50 MG: 10 INJECTION, EMULSION INTRAVENOUS at 11:17

## 2021-01-22 RX ADMIN — PROPOFOL 50 MG: 10 INJECTION, EMULSION INTRAVENOUS at 11:14

## 2021-01-22 RX ADMIN — PROPOFOL 50 MG: 10 INJECTION, EMULSION INTRAVENOUS at 11:09

## 2021-01-22 RX ADMIN — PROPOFOL 150 MG: 10 INJECTION, EMULSION INTRAVENOUS at 11:05

## 2021-01-22 RX ADMIN — SODIUM CHLORIDE: 9 INJECTION, SOLUTION INTRAVENOUS at 11:01

## 2021-01-22 RX ADMIN — PROPOFOL 50 MG: 10 INJECTION, EMULSION INTRAVENOUS at 11:08

## 2021-01-22 RX ADMIN — PROPOFOL 50 MG: 10 INJECTION, EMULSION INTRAVENOUS at 11:25

## 2021-01-22 RX ADMIN — PROPOFOL 50 MG: 10 INJECTION, EMULSION INTRAVENOUS at 11:22

## 2021-01-22 NOTE — DISCHARGE INSTRUCTIONS
High Fiber Diet   WHAT YOU NEED TO KNOW:   What is a high-fiber diet? A high-fiber diet includes foods that have a high amount of fiber  Fiber is the part of fruits, vegetables, and grains that is not broken down by your body  Fiber keeps your bowel movements regular  Fiber can also help lower your cholesterol level, control blood sugar in people with diabetes, and relieve constipation  Fiber can also help you control your weight because it helps you feel full faster  Most adults should eat 25 to 35 grams of fiber each day  Talk to your dietitian or healthcare provider about the amount of fiber you need  What foods are good sources of fiber? · Foods with at least 4 grams of fiber per serving:      ? ? to ½ cup of high-fiber cereal (check the nutrition label on the box)    ? ½ cup of blackberries or raspberries    ? 4 dried prunes    ? 1 cooked artichoke    ? ½ cup of cooked legumes, such as lentils, or red, kidney, and lopez beans    · Foods with 1 to 3 grams of fiber per serving:      ? 1 slice of whole-wheat, pumpernickel, or rye bread    ? ½ cup of cooked brown rice    ? 4 whole-wheat crackers    ? 1 cup of oatmeal    ? ½ cup of cereal with 1 to 3 grams of fiber per serving (check the nutrition label on the box)    ? 1 small piece of fruit, such as an apple, banana, pear, kiwi, or orange    ? 3 dates    ? ½ cup of canned apricots, fruit cocktail, peaches, or pears    ? ½ cup of raw or cooked vegetables, such as carrots, cauliflower, cabbage, spinach, squash, or corn  What are some ways that I can increase fiber in my diet? · Choose brown or wild rice instead of white rice  · Use whole wheat flour in recipes instead of white or all-purpose flour  · Add beans and peas to casseroles or soups  · Choose fresh fruit and vegetables with peels or skins on instead of juices  What other guidelines should I follow? · Add fiber to your diet slowly    You may have abdominal discomfort, bloating, and gas if you add fiber to your diet too quickly  · Drink plenty of liquids as you add fiber to your diet  You may have nausea or develop constipation if you do not drink enough water  Ask how much liquid to drink each day and which liquids are best for you  CARE AGREEMENT:   You have the right to help plan your care  Discuss treatment options with your healthcare provider to decide what care you want to receive  You always have the right to refuse treatment  The above information is an  only  It is not intended as medical advice for individual conditions or treatments  Talk to your doctor, nurse or pharmacist before following any medical regimen to see if it is safe and effective for you  © Copyright [address] Information is for End User's use only and may not be sold, redistributed or otherwise used for commercial purposes  All illustrations and images included in CareNotes® are the copyrighted property of [employer]  or [facility] Yumiko Epstein   Hemorrhoids   WHAT YOU NEED TO KNOW:   What are hemorrhoids? Hemorrhoids are swollen blood vessels inside your rectum (internal hemorrhoids) or on your anus (external hemorrhoids)  Sometimes a hemorrhoid may prolapse  This means it extends out of your anus  What increases my risk for hemorrhoids? · Pregnancy or obesity    · Straining or sitting for a long time during bowel movements    · Liver disease    · Weak muscles around the anus caused by older age, rectal surgery, or anal intercourse    · A lack of physical activity    · Chronic diarrhea or constipation    · A low-fiber diet    What are the signs and symptoms of hemorrhoids?    · Pain or itching around your anus or inside your rectum    · Swelling or bumps around your anus    · Bright red blood in your bowel movement, on the toilet paper, or in the toilet bowl    · Tissue bulging out of your anus (prolapsed hemorrhoids)    · Incontinence (poor control over urine or bowel movements)    How are hemorrhoids diagnosed? Your healthcare provider will ask about your symptoms, the foods you eat, and your bowel movements  He or she will examine your anus for external hemorrhoids  You may need the following:  · A digital rectal exam  is a test to check for hemorrhoids  Your healthcare provider will put a gloved finger inside your anus to feel for the hemorrhoids  · An anoscopy  is a test that uses a scope (small tube with a light and camera on the end) to look at your hemorrhoids  How are hemorrhoids treated? Treatment will depend on your symptoms  You may need any of the following:  · Medicines  can help decrease pain and swelling, and soften your bowel movement  The medicine may be a pill, pad, cream, or ointment  · Procedures  may be used to shrink or remove your hemorrhoid  Examples include rubber-band ligation, sclerotherapy, and photocoagulation  These procedures may be done in your healthcare provider's office  Ask your healthcare provider for more information about these procedures  · Surgery  may be needed to shrink or remove your hemorrhoids  How can I manage my symptoms? · Apply ice on your anus for 15 to 20 minutes every hour or as directed  Use an ice pack, or put crushed ice in a plastic bag  Cover it with a towel before you apply it to your anus  Ice helps prevent tissue damage and decreases swelling and pain  · Take a sitz bath  Fill a bathtub with 4 to 6 inches of warm water  You may also use a sitz bath pan that fits inside a toilet bowl  Sit in the sitz bath for 15 minutes  Do this 3 times a day, and after each bowel movement  The warm water can help decrease pain and swelling  · Keep your anal area clean  Gently wash the area with warm water daily  Soap may irritate the area  After a bowel movement, wipe with moist towelettes or wet toilet paper  Dry toilet paper can irritate the area  How can I help prevent hemorrhoids?    · Do not strain to have a bowel movement  Do not sit on the toilet too long  These actions can increase pressure on the tissues in your rectum and anus  · Drink plenty of liquids  Liquids can help prevent constipation  Ask how much liquid to drink each day and which liquids are best for you  · Eat a variety of high-fiber foods  Examples include fruits, vegetables, and whole grains  Ask your healthcare provider how much fiber you need each day  You may need to take a fiber supplement  · Exercise as directed  Exercise, such as walking, may make it easier to have a bowel movement  Ask your healthcare provider to help you create an exercise plan  · Do not have anal sex  Anal sex can weaken the skin around your rectum and anus  · Avoid heavy lifting  This can cause straining and increase your risk for another hemorrhoid  When should I seek immediate care? · You have severe pain in your rectum or around your anus  · You have severe pain in your abdomen and you are vomiting  · You have bleeding from your anus that soaks through your underwear  When should I contact my healthcare provider? · You have frequent and painful bowel movements  · Your hemorrhoid looks or feels more swollen than usual      · You do not have a bowel movement for 2 days or more  · You see or feel tissue coming through your anus  · You have questions or concerns about your condition or care  CARE AGREEMENT:   You have the right to help plan your care  Learn about your health condition and how it may be treated  Discuss treatment options with your healthcare providers to decide what care you want to receive  You always have the right to refuse treatment  The above information is an  only  It is not intended as medical advice for individual conditions or treatments  Talk to your doctor, nurse or pharmacist before following any medical regimen to see if it is safe and effective for you    © Copyright [address] Information is for [employer] use only and may not be sold, redistributed or otherwise used for commercial purposes  All illustrations and images included in CareNotes® are the copyrighted property of [employer] , Inc  or Anibal Carter  Colorectal Polyps   WHAT YOU NEED TO KNOW:   Colorectal polyps are small growths of tissue in the lining of the colon and rectum  Most polyps are hyperplastic polyps and are usually benign (noncancerous)  Certain types of polyps, called adenomatous polyps, may turn into cancer  DISCHARGE INSTRUCTIONS:   Follow up with your healthcare provider or gastroenterologist as directed: You may need to return for more tests, such as another colonoscopy  Write down your questions so you remember to ask them during your visits  Reduce your risk for colorectal polyps:   · Eat a variety of healthy foods:  Healthy foods include fruit, vegetables, whole-grain breads, low-fat dairy products, beans, lean meat, and fish  Ask if you need to be on a special diet  · Maintain a healthy weight:  Ask your healthcare provider if you need to lose weight and how much you need to lose  Ask for help with a weight loss program     · Exercise:  Begin to exercise slowly and do more as you get stronger  Talk with your healthcare provider before you start an exercise program      · Limit alcohol:  Your risk for polyps increases the more you drink  · Do not smoke: If you smoke, it is never too late to quit  Ask for information about how to stop  For support and more information:   · Karuna Geronimo ([facility])  [address] [address]  Phone: [phone]  Web Address: www digestive  niddk nih gov    Contact your healthcare provider or gastroenterologist if:   · You have a fever  · You have chills, a cough, or feel weak and achy      · You have abdominal pain that does not go away or gets worse after you take medicine  · Your abdomen is swollen  · You are losing weight without trying  · You have questions or concerns about your condition or care  Seek care immediately or call 911 if:   · You have sudden shortness of breath  · You have a fast heart rate, fast breathing, or are too dizzy to stand up  · You have severe abdominal pain  · You see blood in your bowel movement  © Copyright [address] Information is for End User's use only and may not be sold, redistributed or otherwise used for commercial purposes  All illustrations and images included in CareNotes® are the copyrighted property of [employer]  or [address] [place] [address]  The above information is an  only  It is not intended as medical advice for individual conditions or treatments  Talk to your doctor, nurse or pharmacist before following any medical regimen to see if it is safe and effective for you

## 2021-01-22 NOTE — INTERVAL H&P NOTE
H&P reviewed  After examining the patient I find no changes in the patients condition since the H&P had been written      Vitals:    01/22/21 1029   BP: 148/70   Pulse: 55   Resp: 18   Temp: (!) 96 °F (35 6 °C)   SpO2: 99%

## 2021-01-22 NOTE — ANESTHESIA POSTPROCEDURE EVALUATION
Post-Op Assessment Note    CV Status:  Stable  Pain Score: 1    Pain management: adequate     Mental Status:  Sleepy   PONV Controlled:  Controlled   Airway Patency:  Patent      Post Op Vitals Reviewed: Yes      Staff: Anesthesiologist, CRNA         No complications documented      BP      Temp     Pulse     Resp      SpO2

## 2021-01-22 NOTE — H&P
[facility] and Physical -  [facility]  Jacob Mclaughlin 70 y o  female MRN: [id number]                  HPI: Jacob Mclaughlin is a 70y o  year old female who presents for EGD and colonoscopy for occasional difficulty swallowing and rectal bleeding last colonoscopy 7 years ago      REVIEW OF SYSTEMS: Per the HPI, and otherwise unremarkable      Historical Information   Past Medical History:   Diagnosis Date    Arthritis     Diverticulosis     hx of diverticulitis    Dysphagia     GERD (gastroesophageal reflux disease)     hx of gerd but is not on RX    Hypertension     Irregular heart beat     gone after starting metoprolol    Post-nasal drip     uses flonase    Spinal stenosis in cervical region      Past Surgical History:   Procedure Laterality Date    CATARACT EXTRACTION      COLONOSCOPY      HYSTERECTOMY      age 41   Shelby Kncarlos a OOPHORECTOMY      age 41   Shelby Kncarlos a SHOULDER DEBRIDEMENT      calcium deposits right side     Social History   Social History     Substance and Sexual Activity   Alcohol Use Yes    Frequency: 4 or more times a week    Drinks per session: 1 or 2    Binge frequency: Never    Comment: social     Social History     Substance and Sexual Activity   Drug Use Never     Social History     Tobacco Use   Smoking Status Never Smoker   Smokeless Tobacco Never Used     Family History   Problem Relation Age of Onset    Breast cancer Mother 48    Heart failure Mother     Breast cancer Maternal Aunt 62    Other Father         brain tumor    No Known Problems Maternal Grandmother     No Known Problems Maternal Grandfather     No Known Problems Son     No Known Problems Maternal Aunt     No Known Problems Maternal Aunt     No Known Problems Maternal Aunt     No Known Problems Maternal Aunt        Meds/Allergies       Current Outpatient Medications:     Cholecalciferol (Vitamin D3) 125 MCG (5000 UT) TABS    co-enzyme Q-10 30 MG capsule    COLLAGEN PO    estradiol (ESTRACE) 0 5 MG tablet    Magnesium 250 MG TABS    metoprolol tartrate (LOPRESSOR) 50 mg tablet    OREGANO PO    pyridoxine (VITAMIN B6) 100 mg tablet    TURMERIC PO    Current Facility-Administered Medications:     sodium chloride 0 9 % infusion, 20 mL/hr, Intravenous, Continuous    sodium chloride 0 9 % infusion, 30 mL/hr, Intravenous, Continuous    No Known Allergies    Objective     /70   Pulse 55   Temp (!) 96 °F (35 6 °C) (Skin)   Resp 18   SpO2 99%       PHYSICAL EXAM    Gen: NAD  CV: RRR  CHEST: Clear  ABD: soft, NT/ND  EXT: no edema      ASSESSMENT/PLAN:  This is a 70y o  year old female here for EGD and colonoscopy, and she is stable and optimized for her procedure

## 2021-01-28 NOTE — RESULT ENCOUNTER NOTE
Please inform patient that there biopsies were benign   Office visit 2 months repeat colonoscopy 5 years

## 2021-02-03 ENCOUNTER — TELEPHONE (OUTPATIENT)
Dept: GASTROENTEROLOGY | Facility: CLINIC | Age: 76
End: 2021-02-03

## 2021-02-03 NOTE — TELEPHONE ENCOUNTER
Called to schedule f/u ov to flip with Dr Arianna Castorena  She asked for a call, states that she has a headache and wondering if it comes from the medication Dr Arianna Castorena put her on  Thank you!

## 2021-02-04 ENCOUNTER — TELEPHONE (OUTPATIENT)
Dept: GASTROENTEROLOGY | Facility: CLINIC | Age: 76
End: 2021-02-04

## 2021-02-04 NOTE — TELEPHONE ENCOUNTER
Spoke to Lumiant on the phone I was returning her call  Patient reported that since starting famotidine she has not been feeling well on the medication has been feeling nauseated  Denies any vomiting she was wondering why she was taking a medication explained to patient that her EGD showed gastritis and esophagitis with erosions in the stomach  Instructed patient to take half a tablet 2 times a day at breakfast and dinner to help with symptoms of nausea  If in a few days patient continues to not feel well on decrease dosage of medication she is to call the office and we will change her medication  If patient is feeling better we will see her in her follow-up appointment in 2 months  Patient informed that if she needs refills she should call the office and leave them know how she was taking the medication for we can refill the appropriate dosage

## 2021-02-04 NOTE — TELEPHONE ENCOUNTER
Spoke to  Patient  Telephone note placed  Decreased dosage to 20 mg 1/2 tablet at breakfast and dinner  If she continues to have problems with medication she will call office back for medication can be changed

## 2021-02-11 DIAGNOSIS — K21.9 GASTROESOPHAGEAL REFLUX DISEASE WITHOUT ESOPHAGITIS: Primary | ICD-10-CM

## 2021-02-12 RX ORDER — FAMOTIDINE 20 MG/1
20 TABLET, FILM COATED ORAL 2 TIMES DAILY
Qty: 60 TABLET | Refills: 2 | Status: SHIPPED | OUTPATIENT
Start: 2021-02-12 | End: 2021-04-09

## 2021-02-13 DIAGNOSIS — Z23 ENCOUNTER FOR IMMUNIZATION: ICD-10-CM

## 2021-03-02 ENCOUNTER — LAB (OUTPATIENT)
Dept: LAB | Age: 76
End: 2021-03-02
Payer: MEDICARE

## 2021-03-02 ENCOUNTER — TRANSCRIBE ORDERS (OUTPATIENT)
Dept: ADMINISTRATIVE | Age: 76
End: 2021-03-02

## 2021-03-02 ENCOUNTER — HOSPITAL ENCOUNTER (OUTPATIENT)
Dept: RADIOLOGY | Age: 76
Discharge: HOME | End: 2021-03-02
Payer: MEDICARE

## 2021-03-02 VITALS — WEIGHT: 194 LBS | BODY MASS INDEX: 30.45 KG/M2 | HEIGHT: 67 IN

## 2021-03-02 DIAGNOSIS — I51.9 MYXEDEMA HEART DISEASE: ICD-10-CM

## 2021-03-02 DIAGNOSIS — I51.9 MYXEDEMA HEART DISEASE: Primary | ICD-10-CM

## 2021-03-02 DIAGNOSIS — Z12.31 SCREENING MAMMOGRAM, ENCOUNTER FOR: ICD-10-CM

## 2021-03-02 DIAGNOSIS — Z12.31 ENCOUNTER FOR SCREENING MAMMOGRAM FOR MALIGNANT NEOPLASM OF BREAST: ICD-10-CM

## 2021-03-02 DIAGNOSIS — E03.9 MYXEDEMA HEART DISEASE: ICD-10-CM

## 2021-03-02 DIAGNOSIS — E03.9 MYXEDEMA HEART DISEASE: Primary | ICD-10-CM

## 2021-03-02 LAB
T4 FREE SERPL-MCNC: 0.78 NG/DL (ref 0.76–1.46)
TSH SERPL DL<=0.05 MIU/L-ACNC: 2.91 UIU/ML (ref 0.36–3.74)

## 2021-03-02 PROCEDURE — 36415 COLL VENOUS BLD VENIPUNCTURE: CPT

## 2021-03-02 PROCEDURE — 84443 ASSAY THYROID STIM HORMONE: CPT

## 2021-03-02 PROCEDURE — 77063 BREAST TOMOSYNTHESIS BI: CPT

## 2021-03-02 PROCEDURE — 84439 ASSAY OF FREE THYROXINE: CPT

## 2021-03-02 PROCEDURE — 77067 SCR MAMMO BI INCL CAD: CPT

## 2021-04-07 PROBLEM — Z12.11 COLON CANCER SCREENING: Status: ACTIVE | Noted: 2020-06-19

## 2021-04-08 ENCOUNTER — OFFICE VISIT (OUTPATIENT)
Dept: GASTROENTEROLOGY | Facility: CLINIC | Age: 76
End: 2021-04-08
Payer: MEDICARE

## 2021-04-08 VITALS
SYSTOLIC BLOOD PRESSURE: 128 MMHG | BODY MASS INDEX: 29.82 KG/M2 | WEIGHT: 190 LBS | HEART RATE: 59 BPM | DIASTOLIC BLOOD PRESSURE: 61 MMHG | HEIGHT: 67 IN

## 2021-04-08 DIAGNOSIS — R13.19 ESOPHAGEAL DYSPHAGIA: ICD-10-CM

## 2021-04-08 DIAGNOSIS — K62.5 RECTAL BLEEDING: ICD-10-CM

## 2021-04-08 DIAGNOSIS — K21.00 GASTROESOPHAGEAL REFLUX DISEASE WITH ESOPHAGITIS WITHOUT HEMORRHAGE: Primary | ICD-10-CM

## 2021-04-08 DIAGNOSIS — Z86.010 HX OF ADENOMATOUS COLONIC POLYPS: ICD-10-CM

## 2021-04-08 PROBLEM — Z86.0101 HX OF ADENOMATOUS COLONIC POLYPS: Status: ACTIVE | Noted: 2021-04-08

## 2021-04-08 PROCEDURE — 99214 OFFICE O/P EST MOD 30 MIN: CPT | Performed by: INTERNAL MEDICINE

## 2021-04-08 PROCEDURE — 1123F ACP DISCUSS/DSCN MKR DOCD: CPT | Performed by: INTERNAL MEDICINE

## 2021-04-08 RX ORDER — MULTIVIT WITH MINERALS/LUTEIN
1000 TABLET ORAL DAILY
COMMUNITY

## 2021-04-08 RX ORDER — GREEN TEA/HOODIA GORDONII 315-12.5MG
CAPSULE ORAL
COMMUNITY

## 2021-04-08 NOTE — PROGRESS NOTES
[facility] - Outpatient Follow-up Note  Tamie De Leon 70 y o  female MRN: [id number]  Encounter: [id number]          ASSESSMENT AND PLAN:      1  Gastroesophageal reflux disease with esophagitis without hemorrhage   much improved with famotidine  She is working on reflux precautions  Additionally working on weight loss  2  Esophageal dysphagia    Likely secondary to inflammation of resolved with famotidine  3  Rectal bleeding    Internal hemorrhoids were noted her bleeding has resolved  We discussed band ligation should it reoccur  4  Hx of adenomatous colonic polyps    One tubular adenoma repeat colonoscopy January 2026  She will otherwise follow-up in a year     ______________________________________________________________________    SUBJECTIVE:   Very pleasant lady presents for follow-up after EGD and colonoscopy  She was having episodes of rectal bleeding significant reflux dysphagia  A long discussion with regards to the findings  Answered all of her questions  She has not had dysphagia since starting her famotidine  She did not have Garcia's on her biopsies  She did have gastritis without H pylori I  She was found to have internal hemorrhoids and hyperplastic as well as adenomatous polyps  As well as tortuous sigmoid      REVIEW OF SYSTEMS IS OTHERWISE NEGATIVE        Historical Information   Past Medical History:   Diagnosis Date    Arthritis     Diverticulosis     hx of diverticulitis    Dysphagia     GERD (gastroesophageal reflux disease)     hx of gerd but is not on RX    Hypertension     Irregular heart beat     gone after starting metoprolol    Post-nasal drip     uses flonase    Spinal stenosis in cervical region      Past Surgical History:   Procedure Laterality Date    CATARACT EXTRACTION      COLONOSCOPY      HYSTERECTOMY      age 41    OOPHORECTOMY Bilateral     age 41   Andrea Lamprey SHOULDER DEBRIDEMENT      calcium deposits right side Social History   Social History     Substance and Sexual Activity   Alcohol Use Yes    Frequency: 4 or more times a week    Drinks per session: 1 or 2    Binge frequency: Never    Comment: social     Social History     Substance and Sexual Activity   Drug Use Never     Social History     Tobacco Use   Smoking Status Never Smoker   Smokeless Tobacco Never Used     Family History   Problem Relation Age of Onset    Breast cancer Mother 48    Heart failure Mother     Breast cancer Maternal Aunt 62    Other Father         brain tumor    No Known Problems Maternal Grandmother     No Known Problems Maternal Grandfather     No Known Problems Son     No Known Problems Maternal Aunt     No Known Problems Maternal Aunt     No Known Problems Maternal Aunt     No Known Problems Maternal Aunt        Meds/Allergies       Current Outpatient Medications:     Ascorbic Acid (vitamin C) 1000 MG tablet    Cholecalciferol (Vitamin D3) 125 MCG (5000 UT) TABS    co-enzyme Q-10 30 MG capsule    COLLAGEN PO    ELDERBERRY PO    famotidine (PEPCID) 40 MG tablet    Lactobacillus (Probiotic Acidophilus) TABS    Magnesium 250 MG TABS    metoprolol tartrate (LOPRESSOR) 25 mg tablet    OREGANO PO    pyridoxine (VITAMIN B6) 100 mg tablet    TURMERIC PO    estradiol (ESTRACE) 0 5 MG tablet    famotidine (PEPCID) 20 mg tablet    metoprolol tartrate (LOPRESSOR) 50 mg tablet    No Known Allergies        Objective     Blood pressure 128/61, pulse 59, height 5' 7" (1 702 m), weight 86 2 kg (190 lb)  Body mass index is 29 76 kg/m²  PHYSICAL EXAM:      General Appearance:   Alert, cooperative, no distress   HEENT:   Normocephalic, atraumatic, anicteric      Neck:  Supple, symmetrical, trachea midline   Lungs:   Clear to auscultation bilaterally; no rales, rhonchi or wheezing; respirations unlabored    Heart[de-identified]   Regular rate and rhythm; no murmur, rub, or gallop     Abdomen:   Soft, non-tender, non-distended; normal bowel sounds; no masses, no organomegaly    Genitalia:   Deferred    Rectal:   Deferred    Extremities:  No cyanosis, clubbing or edema    Pulses:  2+ and symmetric    Skin:  No jaundice, rashes, or lesions    Lymph nodes:  No palpable cervical lymphadenopathy        Lab Results:   No visits with results within 1 Day(s) from this visit  Latest known visit with results is:   Lab on 03/02/2021   Component Date Value    TSH 3RD GENERATON 03/02/2021 2 910     Free T4 03/02/2021 0 78          Radiology Results:   No results found

## 2021-04-09 DIAGNOSIS — K21.9 GASTROESOPHAGEAL REFLUX DISEASE WITHOUT ESOPHAGITIS: ICD-10-CM

## 2021-04-09 RX ORDER — FAMOTIDINE 20 MG/1
20 TABLET, FILM COATED ORAL 2 TIMES DAILY
Qty: 60 TABLET | Refills: 2 | Status: SHIPPED | OUTPATIENT
Start: 2021-04-09 | End: 2021-07-15

## 2021-04-09 NOTE — TELEPHONE ENCOUNTER
PT WITH RECENT OV, REQUESTING REFILL FAMOTIDINE 20MG BID SENT  W  [facility]   THANKS

## 2021-06-02 DIAGNOSIS — Z78.0 MENOPAUSE: ICD-10-CM

## 2021-06-02 RX ORDER — ESTRADIOL 0.5 MG/1
TABLET ORAL
Qty: 90 TABLET | Refills: 3 | Status: SHIPPED | OUTPATIENT
Start: 2021-06-02 | End: 2022-05-18 | Stop reason: SDUPTHER

## 2021-06-18 NOTE — ANESTHESIA PREPROCEDURE EVALUATION
Procedure:  COLONOSCOPY  EGD    Relevant Problems   GI/HEPATIC   (+) Esophageal dysphagia   (+) Gastroesophageal reflux disease with esophagitis without hemorrhage   (+) Rectal bleeding      NEURO/PSYCH   (+) Hx of adenomatous colonic polyps        Physical Exam    Airway    Mallampati score: II  TM Distance: >3 FB  Neck ROM: full     Dental       Cardiovascular      Pulmonary      Other Findings        Anesthesia Plan  ASA Score- 2     Anesthesia Type- IV sedation with anesthesia with ASA Monitors  Additional Monitors:   Airway Plan:           Plan Factors-Exercise tolerance (METS): >4 METS  Chart reviewed  Induction-     Postoperative Plan-     Informed Consent- Anesthetic plan and risks discussed with patient  I personally reviewed this patient with the CRNA  Discussed and agreed on the Anesthesia Plan with the CRNA  Alisson Sullivan

## 2021-07-15 DIAGNOSIS — K21.9 GASTROESOPHAGEAL REFLUX DISEASE WITHOUT ESOPHAGITIS: ICD-10-CM

## 2021-07-15 RX ORDER — FAMOTIDINE 20 MG/1
TABLET, FILM COATED ORAL
Qty: 60 TABLET | Refills: 2 | Status: SHIPPED | OUTPATIENT
Start: 2021-07-15 | End: 2021-10-18

## 2021-10-08 ENCOUNTER — APPOINTMENT (OUTPATIENT)
Dept: LAB | Facility: CLINIC | Age: 76
End: 2021-10-08
Payer: MEDICARE

## 2021-10-08 DIAGNOSIS — R94.5 NONSPECIFIC ABNORMAL RESULTS OF LIVER FUNCTION STUDY: ICD-10-CM

## 2021-10-08 DIAGNOSIS — G90.09 SPHENOPALATINE NEURALGIA: ICD-10-CM

## 2021-10-08 DIAGNOSIS — M72.0 CONTRACTURE OF PALMAR FASCIA: ICD-10-CM

## 2021-10-08 DIAGNOSIS — E03.9 MYXEDEMA HEART DISEASE: ICD-10-CM

## 2021-10-08 DIAGNOSIS — I51.9 MYXEDEMA HEART DISEASE: ICD-10-CM

## 2021-10-08 DIAGNOSIS — I49.9 VENTRICULAR ARRHYTHMIA: ICD-10-CM

## 2021-10-08 DIAGNOSIS — M51.9 INTERVERTEBRAL DISC DISORDER: ICD-10-CM

## 2021-10-08 DIAGNOSIS — K57.32 DIVERTICULITIS OF COLON (WITHOUT MENTION OF HEMORRHAGE)(562.11): ICD-10-CM

## 2021-10-08 DIAGNOSIS — K64.9 HEMORRHOIDS WITHOUT COMPLICATION: ICD-10-CM

## 2021-10-08 DIAGNOSIS — K21.00 PEPTIC REFLUX ESOPHAGITIS: ICD-10-CM

## 2021-10-08 DIAGNOSIS — F32.9 PROLONGED DEPRESSIVE REACTION: ICD-10-CM

## 2021-10-08 DIAGNOSIS — N95.1 SYMPTOMATIC MENOPAUSAL OR FEMALE CLIMACTERIC STATES: ICD-10-CM

## 2021-10-08 DIAGNOSIS — E78.2 MIXED HYPERLIPIDEMIA: ICD-10-CM

## 2021-10-08 DIAGNOSIS — M12.9 ACUTE ARTHROPATHY: ICD-10-CM

## 2021-10-08 DIAGNOSIS — I10 ESSENTIAL HYPERTENSION, MALIGNANT: ICD-10-CM

## 2021-10-08 DIAGNOSIS — R55 SYNCOPE AND COLLAPSE: ICD-10-CM

## 2021-10-08 LAB
ALBUMIN SERPL BCP-MCNC: 3.8 G/DL (ref 3.5–5)
ALP SERPL-CCNC: 66 U/L (ref 46–116)
ALT SERPL W P-5'-P-CCNC: 30 U/L (ref 12–78)
ANION GAP SERPL CALCULATED.3IONS-SCNC: 4 MMOL/L (ref 4–13)
AST SERPL W P-5'-P-CCNC: 26 U/L (ref 5–45)
BASOPHILS # BLD AUTO: 0.02 THOUSANDS/ΜL (ref 0–0.1)
BASOPHILS NFR BLD AUTO: 0 % (ref 0–1)
BILIRUB SERPL-MCNC: 0.65 MG/DL (ref 0.2–1)
BUN SERPL-MCNC: 15 MG/DL (ref 5–25)
CALCIUM SERPL-MCNC: 9.2 MG/DL (ref 8.3–10.1)
CHLORIDE SERPL-SCNC: 106 MMOL/L (ref 100–108)
CHOLEST SERPL-MCNC: 270 MG/DL (ref 50–200)
CO2 SERPL-SCNC: 27 MMOL/L (ref 21–32)
CREAT SERPL-MCNC: 0.83 MG/DL (ref 0.6–1.3)
EOSINOPHIL # BLD AUTO: 0.12 THOUSAND/ΜL (ref 0–0.61)
EOSINOPHIL NFR BLD AUTO: 2 % (ref 0–6)
ERYTHROCYTE [DISTWIDTH] IN BLOOD BY AUTOMATED COUNT: 13.9 % (ref 11.6–15.1)
GFR SERPL CREATININE-BSD FRML MDRD: 70 ML/MIN/1.73SQ M
GLUCOSE P FAST SERPL-MCNC: 86 MG/DL (ref 65–99)
HCT VFR BLD AUTO: 43.5 % (ref 34.8–46.1)
HDLC SERPL-MCNC: 77 MG/DL
HGB BLD-MCNC: 14.3 G/DL (ref 11.5–15.4)
IMM GRANULOCYTES # BLD AUTO: 0.01 THOUSAND/UL (ref 0–0.2)
IMM GRANULOCYTES NFR BLD AUTO: 0 % (ref 0–2)
LDLC SERPL CALC-MCNC: 175 MG/DL (ref 0–100)
LYMPHOCYTES # BLD AUTO: 1.65 THOUSANDS/ΜL (ref 0.6–4.47)
LYMPHOCYTES NFR BLD AUTO: 31 % (ref 14–44)
MCH RBC QN AUTO: 31.1 PG (ref 26.8–34.3)
MCHC RBC AUTO-ENTMCNC: 32.9 G/DL (ref 31.4–37.4)
MCV RBC AUTO: 95 FL (ref 82–98)
MONOCYTES # BLD AUTO: 0.68 THOUSAND/ΜL (ref 0.17–1.22)
MONOCYTES NFR BLD AUTO: 13 % (ref 4–12)
NEUTROPHILS # BLD AUTO: 2.93 THOUSANDS/ΜL (ref 1.85–7.62)
NEUTS SEG NFR BLD AUTO: 54 % (ref 43–75)
NONHDLC SERPL-MCNC: 193 MG/DL
NRBC BLD AUTO-RTO: 0 /100 WBCS
PLATELET # BLD AUTO: 158 THOUSANDS/UL (ref 149–390)
PMV BLD AUTO: 12.8 FL (ref 8.9–12.7)
POTASSIUM SERPL-SCNC: 4.5 MMOL/L (ref 3.5–5.3)
PROT SERPL-MCNC: 7.3 G/DL (ref 6.4–8.2)
RBC # BLD AUTO: 4.6 MILLION/UL (ref 3.81–5.12)
SODIUM SERPL-SCNC: 137 MMOL/L (ref 136–145)
T4 FREE SERPL-MCNC: 0.84 NG/DL (ref 0.76–1.46)
TRIGL SERPL-MCNC: 91 MG/DL
TSH SERPL DL<=0.05 MIU/L-ACNC: 3.99 UIU/ML (ref 0.36–3.74)
WBC # BLD AUTO: 5.41 THOUSAND/UL (ref 4.31–10.16)

## 2021-10-08 PROCEDURE — 80061 LIPID PANEL: CPT

## 2021-10-08 PROCEDURE — 84439 ASSAY OF FREE THYROXINE: CPT

## 2021-10-08 PROCEDURE — 85025 COMPLETE CBC W/AUTO DIFF WBC: CPT

## 2021-10-08 PROCEDURE — 36415 COLL VENOUS BLD VENIPUNCTURE: CPT

## 2021-10-08 PROCEDURE — 84443 ASSAY THYROID STIM HORMONE: CPT

## 2021-10-08 PROCEDURE — 80053 COMPREHEN METABOLIC PANEL: CPT

## 2021-10-16 DIAGNOSIS — K21.9 GASTROESOPHAGEAL REFLUX DISEASE WITHOUT ESOPHAGITIS: ICD-10-CM

## 2021-10-18 RX ORDER — FAMOTIDINE 20 MG/1
TABLET, FILM COATED ORAL
Qty: 60 TABLET | Refills: 2 | Status: SHIPPED | OUTPATIENT
Start: 2021-10-18 | End: 2022-01-12

## 2022-01-12 DIAGNOSIS — K21.9 GASTROESOPHAGEAL REFLUX DISEASE WITHOUT ESOPHAGITIS: ICD-10-CM

## 2022-01-12 RX ORDER — FAMOTIDINE 20 MG/1
TABLET, FILM COATED ORAL
Qty: 60 TABLET | Refills: 2 | Status: SHIPPED | OUTPATIENT
Start: 2022-01-12 | End: 2022-05-11 | Stop reason: SDUPTHER

## 2022-01-24 ENCOUNTER — TELEPHONE (OUTPATIENT)
Dept: OBGYN CLINIC | Facility: CLINIC | Age: 77
End: 2022-01-24

## 2022-01-24 DIAGNOSIS — Z12.31 ENCOUNTER FOR SCREENING MAMMOGRAM FOR MALIGNANT NEOPLASM OF BREAST: Primary | ICD-10-CM

## 2022-02-23 ENCOUNTER — EVALUATION (OUTPATIENT)
Dept: OCCUPATIONAL THERAPY | Facility: CLINIC | Age: 77
End: 2022-02-23
Payer: MEDICARE

## 2022-02-23 DIAGNOSIS — M65.341 TRIGGER FINGER, RIGHT RING FINGER: Primary | ICD-10-CM

## 2022-02-23 PROCEDURE — 97110 THERAPEUTIC EXERCISES: CPT

## 2022-02-23 PROCEDURE — 97165 OT EVAL LOW COMPLEX 30 MIN: CPT

## 2022-02-23 NOTE — PROGRESS NOTES
OT Evaluation     Today's date: 2022  Patient name: Ajit Mejia  : 1945  MRN: [id number]  Referring provider: Radha Kang MD  Dx:   Encounter Diagnosis     ICD-10-CM    1  Trigger finger, right ring finger  M65 341          Assessment  Assessment details: Ajit Mejia is a 78 y o  female who presents with Trigger finger, right ring finger and left long finger, referred by Dr Emma Barrios  She was treated conservatively for this a few years ago but in the fall of  noted a flare in symptoms  She had CSI with Dr Emma Barrios on  and was referred back to OPOT as she would like to avoid surgery  She presents with premorbid stiffness associated with arthritis, as well as palpable dupuyutren's cords  Patient presented to therapy with below listed impairments, including primarily pain with function, triggering with composite flexion of right ring finger, reduced , edema, and stiffness which impact participation in ADL/IADL  Ajit Mejia would benefit from skilled OPOT as recommended to address these deficits, return to best level of function, and maximize (I) in ADL/IADL  Impairments: abnormal or restricted ROM, impaired physical strength, lacks appropriate home exercise program and pain with function    Symptom irritability: lowUnderstanding of Dx/Px/POC: good   Prognosis: good    Goals  STG  1  Patient will have pain reduction for function by 2 levels in 4-6 weeks  2  Patient will demonstrate improved strength by 10# for function in 4-6 weeks  3  Patient will be (I) with HEP in 1-2 visits  4  Patient will improve AROM at digits without triggering by 1 cm reduction to [facility]  Improved ADL/IADL and work skills by discharge  Decreased pain to <3/10 for function by discharge    Plan  Patient would benefit from: custom splinting and skilled occupational therapy  Planned modality interventions: thermotherapy: hydrocollator packs, cryotherapy, ultrasound, iontophoresis and thermotherapy: paraffin bath  Planned therapy interventions: orthotic fitting/training, neuromuscular re-education, Montiel taping, massage, manual therapy, joint mobilization, strengthening, stretching, therapeutic exercise, therapeutic activities, home exercise program, graded exercise, functional ROM exercises, fine motor coordination training and activity modification  Frequency: 2x week  Duration in visits: 10  Plan of Care beginning date: 2/23/2022  Plan of Care expiration date: 4/6/2022  Treatment plan discussed with: patient        Subjective Evaluation    History of Present Illness  Mechanism of injury: Originally onset 3 years ago, had therapy at [facility] and they felt better, flared up again in fall         Objective     Observations     Left Wrist/Hand   Positive for edema  Right Wrist/Hand   Positive for edema  Neurological Testing     Additional Neurological Details  Nocturnal paresthesias     Active Range of Motion     Left Wrist   Normal active range of motion    Right Wrist   Normal active range of motion    Additional Active Range of Motion Details  Lacks 3 3 2 1 cm RIGHT and 2 2 1 0 cm LEFT from [facility] in active composite grasp index long ring small fingers respectively  Strength/Myotome Testing     Left Wrist/Hand      (2nd hand position)     Trial 1: 32 7    Right Wrist/Hand      (2nd hand position)     Trial 1: 24 4    Swelling     Left Wrist/Hand   Middle     Proximal: 6 3 cm  Ring     Proximal: 5 8 cm    Right Wrist/Hand   Middle     Proximal: 6 5 cm  Ring     Proximal: 6 3 cm       Patient verbalized and demonstrated understanding of education provided on MP extension splint wear and care, wearing schedule, donning/doffing, monitoring of skin integrity and circulation, HEP  via successful teachback and demonstration  Patient instructed to contact office with questions or concerns            Precautions:    Avoid composite flexion and resistance/repetitive gripping until trigger is reduced    Manuals 2/23            [facility]                                                    Neuro Re-Ed                                                                                                        Ther Ex             HEP 10'            Passive composite flexion , active extension             MP extension from tabletop with stretch             Hook fist                                                                 Ther Activity                                                                              Modalities             MHP             Paraffin bath B hands                                                    Ortho fit & train MP extension splint 10'

## 2022-02-23 NOTE — LETTER
2022    Nimesh Bain MD  [address] [facility] [address] [place] [address]    Patient: Trell Chase   YOB: 1945   Date of Visit: 2022     Encounter Diagnosis     ICD-10-CM    1  Trigger finger, right ring finger  M65 341        Dear Dr Jarek Lyles: Thank you for your recent referral of Trell Chase  Please review the attached evaluation summary from Yumiko's recent visit  Please verify that you agree with the plan of care by signing the attached order  If you have any questions or concerns, please do not hesitate to call  I sincerely appreciate the opportunity to share in the care of one of your patients and hope to have another opportunity to work with you in the near future  Sincerely,    Angel Cotter OT      Referring Provider:     I certify that I have read the below Plan of Care and certify the need for these services furnished under this plan of treatment while under my care  Nimesh Bain MD  [address] [facility] [address]  [address]        OT Evaluation     Today's date: 2022  Patient name: Trell Chase  : 1945  MRN: [id number]  Referring provider: Keren Mata MD  Dx:   Encounter Diagnosis     ICD-10-CM    1  Trigger finger, right ring finger  M65 341          Assessment  Assessment details: Trell Chase is a 78 y o  female who presents with Trigger finger, right ring finger and left long finger, referred by Dr Jarek Lyles  She was treated conservatively for this a few years ago but in the  of  noted a flare in symptoms  She had CSI with Dr Jarek Lyles on  and was referred back to OPOT as she would like to avoid surgery  She presents with premorbid stiffness associated with arthritis, as well as palpable dupuyutren's cords   Patient presented to therapy with below listed impairments, including primarily pain with function, triggering with composite flexion of right ring finger, reduced , edema, and stiffness which impact participation in ADL/IADL  Amanda Arce would benefit from skilled OPOT as recommended to address these deficits, return to best level of function, and maximize (I) in ADL/IADL  Impairments: abnormal or restricted ROM, impaired physical strength, lacks appropriate home exercise program and pain with function    Symptom irritability: lowUnderstanding of Dx/Px/POC: good   Prognosis: good    Goals  STG  1  Patient will have pain reduction for function by 2 levels in 4-6 weeks  2  Patient will demonstrate improved strength by 10# for function in 4-6 weeks  3  Patient will be (I) with HEP in 1-2 visits  4  Patient will improve AROM at digits without triggering by 1 cm reduction to [facility]  Improved ADL/IADL and work skills by discharge  Decreased pain to <3/10 for function by discharge    Plan  Patient would benefit from: custom splinting and skilled occupational therapy  Planned modality interventions: thermotherapy: hydrocollator packs, cryotherapy, ultrasound, iontophoresis and thermotherapy: paraffin bath  Planned therapy interventions: orthotic fitting/training, neuromuscular re-education, Montiel taping, massage, manual therapy, joint mobilization, strengthening, stretching, therapeutic exercise, therapeutic activities, home exercise program, graded exercise, functional ROM exercises, fine motor coordination training and activity modification  Frequency: 2x week  Duration in visits: 10  Plan of Care beginning date: 2/23/2022  Plan of Care expiration date: 4/6/2022  Treatment plan discussed with: patient        Subjective Evaluation    History of Present Illness  Mechanism of injury: Originally onset 3 years ago, had therapy at [facility] and they felt better, flared up again in fall         Objective     Observations     Left Wrist/Hand   Positive for edema  Right Wrist/Hand   Positive for edema       Neurological Testing     Additional Neurological Details  Nocturnal paresthesias     Active Range of Motion     Left Wrist   Normal active range of motion    Right Wrist   Normal active range of motion    Additional Active Range of Motion Details  Lacks 3 3 2 1 cm RIGHT and 2 2 1 0 cm LEFT from [facility] in active composite grasp index long ring small fingers respectively  Strength/Myotome Testing     Left Wrist/Hand      (2nd hand position)     Trial 1: 32 7    Right Wrist/Hand      (2nd hand position)     Trial 1: 24 4    Swelling     Left Wrist/Hand   Middle     Proximal: 6 3 cm  Ring     Proximal: 5 8 cm    Right Wrist/Hand   Middle     Proximal: 6 5 cm  Ring     Proximal: 6 3 cm       Patient verbalized and demonstrated understanding of education provided on MP extension splint wear and care, wearing schedule, donning/doffing, monitoring of skin integrity and circulation, HEP  via successful teachback and demonstration  Patient instructed to contact office with questions or concerns            Precautions:    Avoid composite flexion and resistance/repetitive gripping until trigger is reduced    Manuals 2/23            [facility]                                                    Neuro Re-Ed                                                                                                        Ther Ex             HEP 10'            Passive composite flexion , active extension             MP extension from tabletop with stretch             Hook fist                                                                 Ther Activity                                                                              Modalities             MHP             Paraffin bath B hands                                                    Ortho fit & train MP extension splint 10'

## 2022-03-02 ENCOUNTER — OFFICE VISIT (OUTPATIENT)
Dept: OCCUPATIONAL THERAPY | Facility: CLINIC | Age: 77
End: 2022-03-02
Payer: MEDICARE

## 2022-03-02 DIAGNOSIS — M65.341 TRIGGER FINGER, RIGHT RING FINGER: Primary | ICD-10-CM

## 2022-03-02 PROCEDURE — 97018 PARAFFIN BATH THERAPY: CPT

## 2022-03-02 PROCEDURE — 97140 MANUAL THERAPY 1/> REGIONS: CPT

## 2022-03-02 PROCEDURE — 97110 THERAPEUTIC EXERCISES: CPT

## 2022-03-02 NOTE — PROGRESS NOTES
Daily Note     Today's date: 3/2/2022  Patient name: Nuria Lamb  : 1945  MRN: [id number]  Referring provider: Josue Noble MD  Dx:   Encounter Diagnosis     ICD-10-CM    1  Trigger finger, right ring finger  M65 341                   Subjective: The left isn't too bad, it's mostly just the right  Objective: See treatment diary below      Assessment: Tolerated treatment well  Patient would benefit from continued OT      Plan: Continue per plan of care        Precautions:    Avoid composite flexion and resistance/repetitive gripping until trigger is reduced    Manuals 2/23 3/2           IASTM RRF LLF  10'                                                  Neuro Re-Ed                                                                                                        Ther Ex             HEP 10'            Passive composite flexion , active extension  8'           MP extension from tabletop with stretch  3'           Hook fist  wooden pegs 2x                                                               Ther Activity                                                                              Modalities             MHP             Paraffin bath B hands  10'                                     Ortho fit & train MP extension splint 10'

## 2022-03-04 ENCOUNTER — OFFICE VISIT (OUTPATIENT)
Dept: OCCUPATIONAL THERAPY | Facility: CLINIC | Age: 77
End: 2022-03-04
Payer: MEDICARE

## 2022-03-04 DIAGNOSIS — M65.341 TRIGGER FINGER, RIGHT RING FINGER: Primary | ICD-10-CM

## 2022-03-04 PROCEDURE — 97140 MANUAL THERAPY 1/> REGIONS: CPT

## 2022-03-04 PROCEDURE — 97110 THERAPEUTIC EXERCISES: CPT

## 2022-03-04 PROCEDURE — 97018 PARAFFIN BATH THERAPY: CPT

## 2022-03-04 NOTE — PROGRESS NOTES
Daily Note     Today's date: 3/4/2022  Patient name: Lupe Resendez  : 1945  MRN: [id number]  Referring provider: Jd Martínez MD  Dx:   Encounter Diagnosis     ICD-10-CM    1  Trigger finger, right ring finger  M65 341                   Subjective: I think they feel a little better  Objective: See treatment diary below      Assessment: Tolerated treatment well  Patient would benefit from continued OT  Pt is wearing splints which is helping to decrease the frequency of the triggering  Less tenderness RRF  Plan: Continue per plan of care        Precautions:    Avoid composite flexion and resistance/repetitive gripping until trigger is reduced    Manuals 2/23 3/2 3/4          IASTM RRF LLF  10' 10'                                                 Neuro Re-Ed                                                                                                        Ther Ex             HEP 10'            Passive composite flexion , active extension  8' 8'          MP extension from tabletop with stretch  3' 3'          Hook fist  wooden pegs 2x 2x          EDC   RB 1x                                                 Ther Activity                                                                              Modalities             MHP             Paraffin bath B hands  10' 10'                                    Ortho fit & train MP extension splint 10'

## 2022-03-09 ENCOUNTER — OFFICE VISIT (OUTPATIENT)
Dept: OCCUPATIONAL THERAPY | Facility: CLINIC | Age: 77
End: 2022-03-09
Payer: MEDICARE

## 2022-03-09 DIAGNOSIS — M65.341 TRIGGER FINGER, RIGHT RING FINGER: Primary | ICD-10-CM

## 2022-03-09 PROCEDURE — 97110 THERAPEUTIC EXERCISES: CPT

## 2022-03-09 PROCEDURE — 97140 MANUAL THERAPY 1/> REGIONS: CPT

## 2022-03-09 PROCEDURE — 97018 PARAFFIN BATH THERAPY: CPT

## 2022-03-09 NOTE — PROGRESS NOTES
Daily Note     Today's date: 3/9/2022  Patient name: Ajit Mejia  : 1945  MRN: [id number]  Referring provider: Radha Kang MD  Dx:   Encounter Diagnosis     ICD-10-CM    1  Trigger finger, right ring finger  M65 341                   Subjective: Patient reports less pain and less locking  Wearing splint at night and during the day, but when it is off she doesn't notice clicking  Objective: See treatment diary below      Assessment: Tolerated treatment well  Continued improvement in symptoms with use of splints and interventions  Patient would benefit from continued OT  Plan: Continue per plan of care        Precautions:    Avoid composite flexion and resistance/repetitive gripping until trigger is reduced    Manuals 2/23 3/2 3/4 3/9         IASTM RRF LLF  10' 10' 10                                                Neuro Re-Ed                                                                                                        Ther Ex             HEP 10'            Passive composite flexion , active extension  8' 8' 20x B hands         MP extension from tabletop with stretch  3' 3' 20x B hands         Hook fist  wooden pegs 2x 2x Pegs         EDC   RB 1x Red x20 B         Isometric FDS    Jar x15 B hands                                   Ther Activity                                                                              Modalities             MHP             Paraffin bath B hands  10' 10' 10'                                   Ortho fit & train MP extension splint 10'

## 2022-03-11 ENCOUNTER — APPOINTMENT (OUTPATIENT)
Dept: OCCUPATIONAL THERAPY | Facility: CLINIC | Age: 77
End: 2022-03-11
Payer: MEDICARE

## 2022-03-15 ENCOUNTER — APPOINTMENT (OUTPATIENT)
Dept: OCCUPATIONAL THERAPY | Facility: CLINIC | Age: 77
End: 2022-03-15
Payer: MEDICARE

## 2022-03-16 ENCOUNTER — OFFICE VISIT (OUTPATIENT)
Dept: OCCUPATIONAL THERAPY | Facility: CLINIC | Age: 77
End: 2022-03-16
Payer: MEDICARE

## 2022-03-16 DIAGNOSIS — M65.341 TRIGGER FINGER, RIGHT RING FINGER: Primary | ICD-10-CM

## 2022-03-16 PROCEDURE — 97018 PARAFFIN BATH THERAPY: CPT

## 2022-03-16 PROCEDURE — 97110 THERAPEUTIC EXERCISES: CPT

## 2022-03-16 NOTE — PROGRESS NOTES
Daily Note     Today's date: 3/16/2022  Patient name: Selena Davis  : 1945  MRN: [id number]  Referring provider: Raheem Mcfarland MD  Dx:   Encounter Diagnosis     ICD-10-CM    1  Trigger finger, right ring finger  M65 341                   Subjective: It's definitely not locking as much as it was  Objective: See treatment diary below      Assessment: Tolerated treatment well  Less active triggering since wearing the splints  Less crepitation RRF  Patient would benefit from continued OT  Plan: Continue per plan of care        Precautions:    Avoid composite flexion and resistance/repetitive gripping until trigger is reduced    Manuals 2/23 3/2 3/4 3/9 3/16        IASTM RRF, LLF  10' 10' 10 10'                                               Neuro Re-Ed                                                                                                        Ther Ex             HEP 10'            Passive composite flexion , active extension  8' 8' 20x B hands 8'        MP extension from tabletop with stretch  3' 3' 20x B hands 5'        Hook fist b/l  wooden pegs 2x 2x Pegs pegs        EDC b/l   RB 1x Red x20 B Red x20        Isometric FDS    Jar x15 B hands Jar x15 b/l                                  Ther Activity                                                                              Modalities             MHP             Paraffin bath B hands  10' 10' 10' 10'                                  Ortho fit & train MP extension splint 10'

## 2022-03-18 ENCOUNTER — APPOINTMENT (OUTPATIENT)
Dept: OCCUPATIONAL THERAPY | Facility: CLINIC | Age: 77
End: 2022-03-18
Payer: MEDICARE

## 2022-03-23 ENCOUNTER — OFFICE VISIT (OUTPATIENT)
Dept: OCCUPATIONAL THERAPY | Facility: CLINIC | Age: 77
End: 2022-03-23
Payer: MEDICARE

## 2022-03-23 ENCOUNTER — APPOINTMENT (OUTPATIENT)
Dept: OCCUPATIONAL THERAPY | Facility: CLINIC | Age: 77
End: 2022-03-23
Payer: MEDICARE

## 2022-03-23 DIAGNOSIS — M65.341 TRIGGER FINGER, RIGHT RING FINGER: Primary | ICD-10-CM

## 2022-03-23 PROCEDURE — 97110 THERAPEUTIC EXERCISES: CPT

## 2022-03-23 PROCEDURE — 97018 PARAFFIN BATH THERAPY: CPT

## 2022-03-23 NOTE — PROGRESS NOTES
Daily Note     Today's date: 3/23/2022  Patient name: Ajit Mejia  : 1945  MRN: [id number]  Referring provider: Radha Kang MD  Dx:   Encounter Diagnosis     ICD-10-CM    1  Trigger finger, right ring finger  M65 341                   Subjective: They are better but not 100%  Objective: See treatment diary below      Assessment: Tolerated treatment well  Less crepitation B hands since initiating therapy  Patient would benefit from continued OT  Plan: Continue per plan of care        Precautions:    Avoid composite flexion and resistance/repetitive gripping until trigger is reduced    Manuals 2/23 3/2 3/4 3/9 3/16 3/23       IASTM RRF, LLF  10' 10' 10 10' 10'                                              Neuro Re-Ed                                                                                                        Ther Ex             HEP 10'            Passive composite flexion , active extension  8' 8' 20x B hands 8' 8'       MP extension from tabletop with stretch  3' 3' 20x B hands 5' 5'       Hook fist b/l  wooden pegs 2x 2x Pegs pegs pegs       EDC b/l   RB 1x Red x20 B Red x20 Red 20x       Isometric FDS    Jar x15 B hands Jar x15 b/l Jar x15 b/l                                 Ther Activity                                                                              Modalities             MHP             Paraffin bath B hands  10' 10' 10' 10' 10'                                 Ortho fit & train MP extension splint 10'

## 2022-03-24 ENCOUNTER — HOSPITAL ENCOUNTER (OUTPATIENT)
Dept: RADIOLOGY | Age: 77
Discharge: HOME | End: 2022-03-24
Payer: MEDICARE

## 2022-03-24 VITALS — HEIGHT: 67 IN | WEIGHT: 190 LBS | BODY MASS INDEX: 29.82 KG/M2

## 2022-03-24 DIAGNOSIS — Z12.31 ENCOUNTER FOR SCREENING MAMMOGRAM FOR MALIGNANT NEOPLASM OF BREAST: ICD-10-CM

## 2022-03-24 PROCEDURE — 77063 BREAST TOMOSYNTHESIS BI: CPT

## 2022-03-24 PROCEDURE — 77067 SCR MAMMO BI INCL CAD: CPT

## 2022-03-25 ENCOUNTER — APPOINTMENT (OUTPATIENT)
Dept: OCCUPATIONAL THERAPY | Facility: CLINIC | Age: 77
End: 2022-03-25
Payer: MEDICARE

## 2022-03-30 ENCOUNTER — EVALUATION (OUTPATIENT)
Dept: OCCUPATIONAL THERAPY | Facility: CLINIC | Age: 77
End: 2022-03-30
Payer: MEDICARE

## 2022-03-30 DIAGNOSIS — M65.341 TRIGGER FINGER, RIGHT RING FINGER: Primary | ICD-10-CM

## 2022-03-30 PROCEDURE — 97110 THERAPEUTIC EXERCISES: CPT

## 2022-03-30 PROCEDURE — 97530 THERAPEUTIC ACTIVITIES: CPT

## 2022-03-30 NOTE — PROGRESS NOTES
Daily Note     Today's date: 3/30/2022  Patient name: Mayela Mckinney  : 1945  MRN: [id number]  Referring provider: Yudelka Wan MD  Dx:   Encounter Diagnosis     ICD-10-CM    1  Trigger finger, right ring finger  M65 341                   Subjective: They are clicking less but she has stiffness from wearing braces  Objective: See treatment diary below      Assessment: Tolerated treatment well  Reduced symptoms from trigger finger indicating improved inflammation however with stiffness from braces  Patient demonstrated fatigue post treatment, exhibited good technique with therapeutic exercises and would benefit from continued OT  Plan: Continue per plan of care        Precautions:    Avoid composite flexion and resistance/repetitive gripping until trigger is reduced    Manuals 2/23 3/2 3/4 3/9 3/16 3/23 3/30      IASTM RRF, LLF  10' 10' 10 10' 10'                                              Neuro Re-Ed                                                                                                        Ther Ex             HEP 10'            Passive composite flexion , active extension  8' 8' 20x B hands 8' 8' 10 w/ return demo on ea hand      MP extension from tabletop with stretch  3' 3' 20x B hands 5' 5' 5'      Hook fist b/l  wooden pegs 2x 2x Pegs pegs pegs       EDC b/l   RB 1x Red x20 B Red x20 Red 20x       Isometric FDS    Jar x15 B hands Jar x15 b/l Jar x15 b/l                                 Ther Activity             Sm item manip       Keypegs 1x translating palm </> tip                                                          Modalities             MHP             Paraffin bath B hands  10' 10' 10' 10' 10' 10'                                Ortho fit & train MP extension splint 10'

## 2022-04-06 ENCOUNTER — OFFICE VISIT (OUTPATIENT)
Dept: OCCUPATIONAL THERAPY | Facility: CLINIC | Age: 77
End: 2022-04-06
Payer: MEDICARE

## 2022-04-06 DIAGNOSIS — M65.341 TRIGGER FINGER, RIGHT RING FINGER: Primary | ICD-10-CM

## 2022-04-06 PROCEDURE — 97018 PARAFFIN BATH THERAPY: CPT

## 2022-04-06 PROCEDURE — 97110 THERAPEUTIC EXERCISES: CPT

## 2022-04-06 NOTE — PROGRESS NOTES
Daily Note     Today's date: 2022  Patient name: Bart Rosales  : 1945  MRN: [id number]  Referring provider: Eliot Kumar MD  Dx:   Encounter Diagnosis     ICD-10-CM    1  Trigger finger, right ring finger  M65 341                   Subjective: I think they are doing better, and that the splints help  Objective: See treatment diary below      Assessment: Tolerated treatment well  Patient exhibited good technique with therapeutic exercises and would benefit from continued OT      Plan: F/U in 2 weeks, then may transition to HEP       Precautions:    Avoid composite flexion and resistance/repetitive gripping until trigger is reduced    Manuals 2/23 3/2 3/4 3/9 3/16 3/23 3/30 4/6     IASTM RRF, LLF  10' 10' 10 10' 10'  10'                                            Neuro Re-Ed                                                                                                        Ther Ex             HEP 10'            Passive composite flexion , active extension  8' 8' 20x B hands 8' 8' 10 w/ return demo on ea hand 15'     MP extension from tabletop with stretch  3' 3' 20x B hands 5' 5' 5' 5'     Hook fist b/l  wooden pegs 2x 2x Pegs pegs pegs       EDC b/l   RB 1x Red x20 B Red x20 Red 20x       Isometric FDS    Jar x15 B hands Jar x15 b/l Jar x15 b/l                                 Ther Activity             Sm item manip       Keypegs 1x translating palm </> tip Keypegs 1x translating palm </> tip                                                            Modalities             MHP             Paraffin bath B hands  10' 10' 10' 10' 10' 10' 10'                               Ortho fit & train MP extension splint 10'

## 2022-04-19 ENCOUNTER — OFFICE VISIT (OUTPATIENT)
Dept: OCCUPATIONAL THERAPY | Facility: CLINIC | Age: 77
End: 2022-04-19
Payer: MEDICARE

## 2022-04-19 ENCOUNTER — APPOINTMENT (OUTPATIENT)
Dept: LAB | Facility: CLINIC | Age: 77
End: 2022-04-19
Payer: MEDICARE

## 2022-04-19 DIAGNOSIS — M65.341 TRIGGER FINGER, RIGHT RING FINGER: Primary | ICD-10-CM

## 2022-04-19 PROCEDURE — 97530 THERAPEUTIC ACTIVITIES: CPT

## 2022-04-19 PROCEDURE — 97110 THERAPEUTIC EXERCISES: CPT

## 2022-04-19 PROCEDURE — 97018 PARAFFIN BATH THERAPY: CPT

## 2022-04-19 NOTE — PROGRESS NOTES
Daily Note     Today's date: 2022  Patient name: Reta Baires  : 1945  MRN: [id number]  Referring provider: Shelbie Oliver MD  Dx:   Encounter Diagnosis     ICD-10-CM    1  Trigger finger, right ring finger  M65 341        Start Time: 1258  Stop Time: 1350  Total time in clinic (min): 52 minutes    Subjective: "It doesn't trigger as much, but I can't make a fist or anything " She reported that her fingers hardly trigger anymore  "The right hand doesn't work as well as the left does "      Objective: See treatment diary below      Assessment: Tolerated treatment well  Patient presented with continued stiffness with right hand digit flexion  She did not have any locking of digits this date  Patient tolerated all exercises well  She tolerated new range of motion and new strengthening exercises well with only minimal discomfort noted with pressure to A1 pulley of right ring finger  Patient exhibited good technique with therapeutic exercises and would benefit from continued OT      Plan: Continue per plan of care  Progress treatment as tolerated         Precautions:    Avoid composite flexion and resistance/repetitive gripping until trigger is reduced    Manuals 2/23 3/2 3/4 3/9 3/16 3/23 3/30 4/6 4/19    IASTM RRF, LLF  10' 10' 10 10' 10'  10' 3'                                           Neuro Re-Ed                                                                                                        Ther Ex             HEP 10'            Passive composite flexion , active extension  8' 8' 20x B hands 8' 8' 10 w/ return demo on ea hand 15' 15'    MP extension from tabletop with stretch  3' 3' 20x B hands 5' 5' 5' 5' x10    Hook fist b/l  wooden pegs 2x 2x Pegs pegs pegs       EDC b/l   RB 1x Red x20 B Red x20 Red 20x   Yellow ext squeeze    Isometric FDS    Jar x15 B hands Jar x15 b/l Jar x15 b/l       Digi-flex         Yellow x10                 Ther Activity             Sm item manip Keypegs 1x translating palm </> tip Keypegs 1x translating palm </> tip    Keypegs 1x translating, remove alt digits     Theraputty- , roll, pinch         Yellow    Towel scrunches         x10                              Modalities             MHP             Paraffin bath B hands  10' 10' 10' 10' 10' 10' 10' 10'                              Ortho fit & train MP extension splint 10'

## 2022-04-25 ENCOUNTER — TELEPHONE (OUTPATIENT)
Dept: OBGYN CLINIC | Facility: CLINIC | Age: 77
End: 2022-04-25

## 2022-04-25 DIAGNOSIS — N63.10 MASS OF RIGHT BREAST, UNSPECIFIED QUADRANT: Primary | ICD-10-CM

## 2022-04-25 NOTE — TELEPHONE ENCOUNTER
Pt will call for diag r mammo and r u/s  She was examined by Dr Mary Jorgensen   Pt also can feel lump

## 2022-04-25 NOTE — TELEPHONE ENCOUNTER
She has a mother and aunt who have had breast cancer  She only wants to see you  She had a mammo 3/24/22 which is negative  Must you see her before I get diag mammo - we have rules about pt being seen  I cannot reach your office by phone - too busy

## 2022-04-25 NOTE — TELEPHONE ENCOUNTER
Dr Jozef Tony office called and saw Raquel Phlegm today- she had a recent cristofer ordered by Dr Acacia Corbett done recently  The doctor today felt a right nodule and would like Dr Yuridia Tom to examine it to order further evaluation  Please call Pt @ [phone]

## 2022-04-25 NOTE — TELEPHONE ENCOUNTER
We can offer Malia Morales an appointment with me  She does have a family history of breast cancer, if she would prefer a breast surgeon I can offer this as well  For palpable nodule, imaging is warranted as well    Diagnostic mammogram/ultraosund

## 2022-04-26 ENCOUNTER — OFFICE VISIT (OUTPATIENT)
Dept: OCCUPATIONAL THERAPY | Facility: CLINIC | Age: 77
End: 2022-04-26
Payer: MEDICARE

## 2022-04-26 DIAGNOSIS — M65.341 TRIGGER FINGER, RIGHT RING FINGER: Primary | ICD-10-CM

## 2022-04-26 PROCEDURE — 97110 THERAPEUTIC EXERCISES: CPT

## 2022-04-26 PROCEDURE — 97018 PARAFFIN BATH THERAPY: CPT

## 2022-04-26 PROCEDURE — 97530 THERAPEUTIC ACTIVITIES: CPT

## 2022-04-26 PROCEDURE — 97140 MANUAL THERAPY 1/> REGIONS: CPT

## 2022-04-26 NOTE — PROGRESS NOTES
Daily Note     Today's date: 2022  Patient name: Gail Flood  : 1945  MRN: [id number]  Referring provider: Daniel Muniz MD  Dx:   Encounter Diagnosis     ICD-10-CM    1  Trigger finger, right ring finger  M65 341                   Subjective: I made an appt for  to see the dr   I want an injection on the right  Objective: See treatment diary below      Assessment: Tolerated treatment well  Persistent stiffness which limits function  (+) triggering of RRF at times with full comp fist   Pt reports only doing ex 1x/day-instructed to increase ROM ex to 3x/day to help control stiffness  Patient exhibited good technique with therapeutic exercises and would benefit from continued OT      Plan: Continue per plan of care  Progress treatment as tolerated         Precautions:    Avoid composite flexion and resistance/repetitive gripping until trigger is reduced    Manuals 2/23 3/2 3/4 3/9 3/16 3/23 3/30 4/6 4/19 4/26   IASTM RRF, LLF  10' 10' 10 10' 10'  10' 3' 10'                                          Neuro Re-Ed                                                                                                        Ther Ex             HEP 10'            Passive composite flexion , active extension  8' 8' 20x B hands 8' 8' 10 w/ return demo on ea hand 15' 15' 15'   MP extension from tabletop with stretch  3' 3' 20x B hands 5' 5' 5' 5' x10 5'   Hook fist b/l  wooden pegs 2x 2x Pegs pegs pegs       EDC b/l   RB 1x Red x20 B Red x20 Red 20x   Yellow ext squeeze Yellow web 20x   Isometric FDS    Jar x15 B hands Jar x15 b/l Jar x15 b/l       Digi-flex         Yellow x10 Y 2x10                Ther Activity             Sm item manip       Keypegs 1x translating palm </> tip Keypegs 1x translating palm </> tip    Keypegs 1x translating, remove alt digits  Key pegs w/ 1 marble (l only)   Theraputty- , roll, pinch         Yellow    Towel scrunches         x10 Modalities             MHP             Paraffin bath B hands  10' 10' 10' 10' 10' 10' 10' 10' 10'                             Ortho fit & train MP extension splint 10'

## 2022-05-02 ENCOUNTER — APPOINTMENT (OUTPATIENT)
Dept: OCCUPATIONAL THERAPY | Facility: CLINIC | Age: 77
End: 2022-05-02
Payer: MEDICARE

## 2022-05-03 ENCOUNTER — OFFICE VISIT (OUTPATIENT)
Dept: OCCUPATIONAL THERAPY | Facility: CLINIC | Age: 77
End: 2022-05-03
Payer: MEDICARE

## 2022-05-03 DIAGNOSIS — M65.341 TRIGGER FINGER, RIGHT RING FINGER: Primary | ICD-10-CM

## 2022-05-03 PROCEDURE — 97530 THERAPEUTIC ACTIVITIES: CPT

## 2022-05-03 PROCEDURE — 97018 PARAFFIN BATH THERAPY: CPT

## 2022-05-03 PROCEDURE — 97110 THERAPEUTIC EXERCISES: CPT

## 2022-05-03 NOTE — PROGRESS NOTES
OT Re-Evaluation     Today's date: 5/3/2022  Patient name: Mark Ferguson  : 1945  MRN: [id number]  Referring provider: Eusebio Bateman MD  Dx:   Encounter Diagnosis     ICD-10-CM    1  Trigger finger, right ring finger  M65 341          Assessment  Assessment details: Mark Ferguson is a 78 y o  female who presents with Trigger finger, right ring finger and left long finger, referred by Dr Elyse Ganser  She was treated conservatively for this a few years ago but in the fall of  noted a flare in symptoms  She had CSI with Dr Elyse Ganser on  and was referred back to OPOT as she would like to avoid surgery  She presents with premorbid stiffness associated with arthritis, as well as palpable dupuyutren's cords  Patient presented with increased range of motion since last assessed  Strength assessed this date  Mild deficits in strength  Impairments: abnormal or restricted ROM, impaired physical strength and pain with function    Symptom irritability: lowUnderstanding of Dx/Px/POC: good   Prognosis: good    Goals  STG  1  Patient will demonstrate improved strength by 10# for function in 4-6 weeks -PARTIALLY MET  2  Patient will be (I) with HEP in 1-2 visits -MET  3  Patient will improve AROM at digits without triggering by 1 cm reduction to [facility]   -MET    LTG  Improved ADL/IADL and work skills by discharge  Decreased pain to <3/10 for function by discharge    Plan  Patient would benefit from: custom splinting and skilled occupational therapy  Planned modality interventions: thermotherapy: hydrocollator packs, ultrasound and thermotherapy: paraffin bath  Planned therapy interventions: orthotic fitting/training, neuromuscular re-education, Montiel taping, massage, manual therapy, joint mobilization, strengthening, stretching, therapeutic exercise, therapeutic activities, home exercise program, graded exercise, functional ROM exercises, fine motor coordination training and activity modification  Frequency: 1-2x/week  Duration in weeks: 8  Plan of Care beginning date: 5/3/2022  Plan of Care expiration date: 6/28/2022  Treatment plan discussed with: patient        Subjective Evaluation    History of Present Illness  Mechanism of injury: "Some days it seems like it's better and then 10 minutes later it's just as stiff as it was before  It's not clicking, but I'm wearing the splint to bed "         Objective     Neurological Testing     Additional Neurological Details  Nocturnal paresthesias     Active Range of Motion     Left Wrist   Normal active range of motion    Right Wrist   Normal active range of motion    Additional Active Range of Motion Details  Lacks 1 2 3 1 cm RIGHT and 1 0 8 0 5 0 cm LEFT from [facility] in active composite grasp index long ring small fingers respectively        Strength/Myotome Testing     Left Wrist/Hand      (2nd hand position)     Trial 1: 39 6    Thumb Strength  Key/Lateral Pinch     Trial 1: 7 3    Right Wrist/Hand      (2nd hand position)     Trial 1: 24    Thumb Strength   Key/Lateral Pinch     Trial 1: 6 9         Precautions:    Avoid composite flexion and resistance/repetitive gripping until trigger is reduced    Manuals 5/3 3/2 3/4 3/9 3/16 3/23 3/30 4/6 4/19 4/26   IASTM RRF, LLF  10' 10' 10 10' 10'  10' 3' 10'                                          Neuro Re-Ed                                                                                                        Ther Ex             RE 10'            Passive composite flexion , active extension 8' 8' 8' 20x B hands 8' 8' 10 w/ return demo on ea hand 15' 15' 15'   MP extension from tabletop with stretch x10 3' 3' 20x B hands 5' 5' 5' 5' x10 5'   Hook fist b/l  wooden pegs 2x 2x Pegs pegs pegs       EDC b/l Yellow web x20  RB 1x Red x20 B Red x20 Red 20x   Yellow ext squeeze Yellow web 20x   Isometric FDS    Jar x15 B hands Jar x15 b/l Jar x15 b/l       Digi-flex Yellow 2x10        Yellow x10 Y 2x10 Ther Activity             Sm item manip Key pegs w/ 1 marble (l only)      Keypegs 1x translating palm </> tip Keypegs 1x translating palm </> tip    Keypegs 1x translating, remove alt digits  Key pegs w/ 1 marble (l only)   Theraputty- , roll, pinch         Yellow    Towel scrunches x10        x10                              Modalities             MHP             Paraffin bath B hands 10' 10' 10' 10' 10' 10' 10' 10' 10' 10'                             Ortho fit & train

## 2022-05-03 NOTE — LETTER
May 3, 2022    MD [address]  [facility] [id number]    Patient: Susan Barnett   YOB: 1945   Date of Visit: 5/3/2022     Encounter Diagnosis     ICD-10-CM    1  Trigger finger, right ring finger  M65 341        Dear Dr Sofya Major: Thank you for your recent referral of Susan Barnett  Please review the attached evaluation summary from Yumiko's recent visit  Please verify that you agree with the plan of care by signing the attached order  If you have any questions or concerns, please do not hesitate to call  I sincerely appreciate the opportunity to share in the care of one of your patients and hope to have another opportunity to work with you in the near future  Sincerely,    Yudy Le, JONATAN      Referring Provider:     I certify that I have read the below Plan of Care and certify the need for these services furnished under this plan of treatment while under my care  MD [address]  [facility] [address] [id number]  Via Fax: [phone]        OT Re-Evaluation     Today's date: 5/3/2022  Patient name: Susan Barnett  : 1945  MRN: [id number]  Referring provider: Adriana Betts MD  Dx:   Encounter Diagnosis     ICD-10-CM    1  Trigger finger, right ring finger  M65 341          Assessment  Assessment details: Susan Barnett is a 78 y o  female who presents with Trigger finger, right ring finger and left long finger, referred by Dr Adam Jensen  She was treated conservatively for this a few years ago but in the fall of  noted a flare in symptoms  She had CSI with Dr Adam Jensen on  and was referred back to OPOT as she would like to avoid surgery  She presents with premorbid stiffness associated with arthritis, as well as palpable dupuyutren's cords  Patient presented with increased range of motion since last assessed  Strength assessed this date  Mild deficits in strength  Impairments: abnormal or restricted ROM, impaired physical strength and pain with function    Symptom irritability: lowUnderstanding of Dx/Px/POC: good   Prognosis: good    Goals  STG  1  Patient will demonstrate improved strength by 10# for function in 4-6 weeks -PARTIALLY MET  2  Patient will be (I) with HEP in 1-2 visits -MET  3  Patient will improve AROM at digits without triggering by 1 cm reduction to [facility]  -MET    LTG  Improved ADL/IADL and work skills by discharge  Decreased pain to <3/10 for function by discharge    Plan  Patient would benefit from: custom splinting and skilled occupational therapy  Planned modality interventions: thermotherapy: hydrocollator packs, ultrasound and thermotherapy: paraffin bath  Planned therapy interventions: orthotic fitting/training, neuromuscular re-education, Montiel taping, massage, manual therapy, joint mobilization, strengthening, stretching, therapeutic exercise, therapeutic activities, home exercise program, graded exercise, functional ROM exercises, fine motor coordination training and activity modification  Frequency: 1-2x/week  Duration in weeks: 8  Plan of Care beginning date: 5/3/2022  Plan of Care expiration date: 6/28/2022  Treatment plan discussed with: patient        Subjective Evaluation    History of Present Illness  Mechanism of injury: "Some days it seems like it's better and then 10 minutes later it's just as stiff as it was before  It's not clicking, but I'm wearing the splint to bed "         Objective     Neurological Testing     Additional Neurological Details  Nocturnal paresthesias     Active Range of Motion     Left Wrist   Normal active range of motion    Right Wrist   Normal active range of motion    Additional Active Range of Motion Details  Lacks 1 2 3 1 cm RIGHT and 1 0 8 0 5 0 cm LEFT from [facility] in active composite grasp index long ring small fingers respectively        Strength/Myotome Testing     Left Wrist/Hand      (2nd hand position)     Trial 1: 39 6    Thumb Strength  Key/Lateral Pinch     Trial 1: 7 3    Right Wrist/Hand      (2nd hand position)     Trial 1: 24    Thumb Strength   Key/Lateral Pinch     Trial 1: 6 9         Precautions:    Avoid composite flexion and resistance/repetitive gripping until trigger is reduced    Manuals 5/3 3/2 3/4 3/9 3/16 3/23 3/30 4/6 4/19 4/26   IASTM RRF, LLF  10' 10' 10 10' 10'  10' 3' 10'                                          Neuro Re-Ed                                                                                                        Ther Ex             RE 10'            Passive composite flexion , active extension 8' 8' 8' 20x B hands 8' 8' 10 w/ return demo on ea hand 15' 15' 15'   MP extension from tabletop with stretch x10 3' 3' 20x B hands 5' 5' 5' 5' x10 5'   Hook fist b/l  wooden pegs 2x 2x Pegs pegs pegs       EDC b/l Yellow web x20  RB 1x Red x20 B Red x20 Red 20x   Yellow ext squeeze Yellow web 20x   Isometric FDS    Jar x15 B hands Jar x15 b/l Jar x15 b/l       Digi-flex Yellow 2x10        Yellow x10 Y 2x10                Ther Activity             Sm item manip Key pegs w/ 1 marble (l only)      Keypegs 1x translating palm </> tip Keypegs 1x translating palm </> tip    Keypegs 1x translating, remove alt digits  Key pegs w/ 1 marble (l only)   Theraputty- , roll, pinch         Yellow    Towel scrunches x10        x10                              Modalities             MHP             Paraffin bath B hands 10' 10' 10' 10' 10' 10' 10' 10' 10' 10'                             Ortho fit & train

## 2022-05-04 ENCOUNTER — HOSPITAL ENCOUNTER (OUTPATIENT)
Dept: MAMMOGRAPHY | Facility: CLINIC | Age: 77
Discharge: HOME | End: 2022-05-04
Payer: MEDICARE

## 2022-05-04 ENCOUNTER — HOSPITAL ENCOUNTER (OUTPATIENT)
Dept: ULTRASOUND IMAGING | Facility: CLINIC | Age: 77
Discharge: HOME | End: 2022-05-04
Payer: MEDICARE

## 2022-05-04 DIAGNOSIS — N63.10 MASS OF RIGHT BREAST, UNSPECIFIED QUADRANT: ICD-10-CM

## 2022-05-04 PROCEDURE — 76642 ULTRASOUND BREAST LIMITED: CPT

## 2022-05-04 PROCEDURE — G0279 TOMOSYNTHESIS, MAMMO: HCPCS

## 2022-05-04 PROCEDURE — 77065 DX MAMMO INCL CAD UNI: CPT

## 2022-05-11 ENCOUNTER — OFFICE VISIT (OUTPATIENT)
Dept: GASTROENTEROLOGY | Facility: CLINIC | Age: 77
End: 2022-05-11
Payer: MEDICARE

## 2022-05-11 VITALS
HEIGHT: 67 IN | HEART RATE: 54 BPM | BODY MASS INDEX: 30.64 KG/M2 | SYSTOLIC BLOOD PRESSURE: 151 MMHG | DIASTOLIC BLOOD PRESSURE: 66 MMHG | WEIGHT: 195.2 LBS

## 2022-05-11 DIAGNOSIS — R13.19 ESOPHAGEAL DYSPHAGIA: ICD-10-CM

## 2022-05-11 DIAGNOSIS — Z86.010 HX OF ADENOMATOUS COLONIC POLYPS: ICD-10-CM

## 2022-05-11 DIAGNOSIS — K21.9 GASTROESOPHAGEAL REFLUX DISEASE WITHOUT ESOPHAGITIS: Primary | ICD-10-CM

## 2022-05-11 DIAGNOSIS — K62.5 RECTAL BLEEDING: ICD-10-CM

## 2022-05-11 PROCEDURE — 99213 OFFICE O/P EST LOW 20 MIN: CPT | Performed by: INTERNAL MEDICINE

## 2022-05-11 RX ORDER — FAMOTIDINE 20 MG/1
20 TABLET, FILM COATED ORAL DAILY
Qty: 90 TABLET | Refills: 3 | Status: SHIPPED | OUTPATIENT
Start: 2022-05-11

## 2022-05-11 NOTE — PROGRESS NOTES
[facility] - Outpatient Follow-up Note  Fanny Thapa 78 y o  female MRN: [id number]  Encounter: [id number]          ASSESSMENT AND PLAN:      1  Gastroesophageal reflux disease without esophagitis  Controlled on famotidine once a day next EGD in January of 2026 she did have a focus of multilayer epithelium in the lower esophagus  - famotidine (PEPCID) 20 mg tablet; Take 1 tablet (20 mg total) by mouth in the morning  Dispense: 90 tablet; Refill: 3    2  Esophageal dysphagia  Asymptomatic    3  Rectal bleeding  Resolved    4  Hx of adenomatous colonic polyps  Surveillance January of 2026 along with EGD  She will otherwise follow-up in a year     ______________________________________________________________________    SUBJECTIVE:  81-year-old very pleasant lady presents for annual follow-up  She needs refill of her famotidine  She has not had it many problems with her reflux her dysphagia has resolved rectal bleeding has resolved  Colon cancer screening is up-to-date  REVIEW OF SYSTEMS IS OTHERWISE NEGATIVE        Historical Information   Past Medical History:   Diagnosis Date    Arthritis     Diverticulosis     hx of diverticulitis    Dysphagia     GERD (gastroesophageal reflux disease)     hx of gerd but is not on RX    Hypertension     Irregular heart beat     gone after starting metoprolol    Post-nasal drip     uses flonase    Spinal stenosis in cervical region      Past Surgical History:   Procedure Laterality Date    CATARACT EXTRACTION      COLONOSCOPY      HYSTERECTOMY      age 41   [facility] OOPHORECTOMY Bilateral     age 41   [facility] Flower SHOULDER DEBRIDEMENT      calcium deposits right side     Social History   Social History     Substance and Sexual Activity   Alcohol Use Yes    Comment: social     Social History     Substance and Sexual Activity   Drug Use Never     Social History     Tobacco Use   Smoking Status Never Smoker   Smokeless Tobacco Never Used Family History   Problem Relation Age of Onset    Breast cancer Mother 48    Heart failure Mother     Breast cancer Maternal Aunt 62    Other Father         brain tumor    No Known Problems Maternal Grandmother     No Known Problems Maternal Grandfather     No Known Problems Son     No Known Problems Maternal Aunt     No Known Problems Maternal Aunt     No Known Problems Maternal Aunt     No Known Problems Maternal Aunt        Meds/Allergies       Current Outpatient Medications:     Ascorbic Acid (vitamin C) 1000 MG tablet    Cholecalciferol (Vitamin D3) 125 MCG (5000 UT) TABS    co-enzyme Q-10 30 MG capsule    COLLAGEN PO    ELDERBERRY PO    estradiol (ESTRACE) 0 5 MG tablet    famotidine (PEPCID) 20 mg tablet    Lactobacillus (Probiotic Acidophilus) TABS    Magnesium 250 MG TABS    metoprolol tartrate (LOPRESSOR) 25 mg tablet    OREGANO PO    pyridoxine (VITAMIN B6) 100 mg tablet    TURMERIC PO    metoprolol tartrate (LOPRESSOR) 50 mg tablet    No Known Allergies        Objective     Blood pressure 151/66, pulse (!) 54, height 5' 7" (1 702 m), weight 88 5 kg (195 lb 3 2 oz)  Body mass index is 30 57 kg/m²  PHYSICAL EXAM:      General Appearance:   Alert, cooperative, no distress   HEENT:   Normocephalic, atraumatic, anicteric      Neck:  Supple, symmetrical, trachea midline   Lungs:   Clear to auscultation bilaterally; no rales, rhonchi or wheezing; respirations unlabored    Heart[de-identified]   Regular rate and rhythm; no murmur, rub, or gallop  Abdomen:   Soft, non-tender, non-distended; normal bowel sounds; no masses, no organomegaly    Genitalia:   Deferred    Rectal:   Deferred    Extremities:  No cyanosis, clubbing or edema    Pulses:  2+ and symmetric    Skin:  No jaundice, rashes, or lesions    Lymph nodes:  No palpable cervical lymphadenopathy        Lab Results:   No visits with results within 1 Day(s) from this visit     Latest known visit with results is:   Transcribe Orders on 04/19/2022   Component Date Value    Vit D, 25-Hydroxy 04/19/2022 49 4     Free T4 04/19/2022 0 84     TSH 3RD GENERATON 04/19/2022 3 650     Cholesterol 04/19/2022 260 (A)    Triglycerides 04/19/2022 137     HDL, Direct 04/19/2022 67     LDL Calculated 04/19/2022 166 (A)    Non-HDL-Chol (CHOL-HDL) 04/19/2022 193          Radiology Results:   Mammo diagnostic right w 3d & cad, US breast right limited (diagnostic)    Result Date: 5/4/2022  Narrative: DIAGNOSIS: Mass of right breast, unspecified quadrant TECHNIQUE: Digital diagnostic mammography was performed  Computer Aided Detection (CAD) analyzed all applicable images  Ultrasound of the right breast(s) was performed  COMPARISONS: Prior breast imaging dated: 03/24/2022, 03/02/2021, 02/27/2020, 02/22/2019, 01/26/2018, 12/30/2016, 11/13/2015, 04/21/2014, and 03/27/2013 RELEVANT HISTORY: Family Breast Cancer History: History of breast cancer in Mother, Maternal Aunt  Family Medical History: Family medical history includes breast cancer in 2 relatives (maternal aunt, mother)  Personal History: Hormone history includes estrogen replacement therapy  Surgical history includes hysterectomy and oophorectomy  No known relevant medical history  RISK ASSESSMENT: 5 Year Tyrer-Cuzick: 2 05 % 10 Year Tyrer-Cuzick: 4 32 % Lifetime Tyrer-Cuzick: 4 8 % TISSUE DENSITY: The breasts are almost entirely fatty  INDICATION: Mark Ferguson is a 78 y o  female presenting for evaluation of right breast palpable lump  FINDINGS: Palpable triangle marker over the right medial breast has a few subjacent round circumscribed low-density masses  Sonographic evaluation shows corresponding benign cysts, some of which contain internal debris, located at 01:00 o'clock 6 cm from the nipple  Sonographic mammographic appearance is diagnostic of benign oil cysts   Elsewhere in the right breast on mammography, there are no suspicious masses, grouped microcalcifications or areas of unexplained architectural distortion  The skin and nipple areolar complex are unremarkable  Impression: Patient's palpable concern right medial breast are a few benign oil cyst  Patient should return to annual screening mammography which will be due after 03/24/2023  ASSESSMENT/BI-RADS CATEGORY: Right: 2 - Benign Overall: 2 - Benign RECOMMENDATION:      - Clinical management for the right breast       - Return to routine screening for both breasts   Workstation ID: [id number]

## 2022-05-18 ENCOUNTER — OFFICE VISIT (OUTPATIENT)
Dept: OCCUPATIONAL THERAPY | Facility: CLINIC | Age: 77
End: 2022-05-18
Payer: MEDICARE

## 2022-05-18 DIAGNOSIS — Z78.0 MENOPAUSE: ICD-10-CM

## 2022-05-18 DIAGNOSIS — M65.341 TRIGGER FINGER, RIGHT RING FINGER: Primary | ICD-10-CM

## 2022-05-18 PROCEDURE — 97110 THERAPEUTIC EXERCISES: CPT

## 2022-05-18 PROCEDURE — 97530 THERAPEUTIC ACTIVITIES: CPT

## 2022-05-18 PROCEDURE — 97140 MANUAL THERAPY 1/> REGIONS: CPT

## 2022-05-18 RX ORDER — ESTRADIOL 0.5 MG/1
TABLET ORAL
Qty: 90 TABLET | Refills: 0 | Status: SHIPPED | OUTPATIENT
Start: 2022-05-18

## 2022-05-18 NOTE — PROGRESS NOTES
Daily Note     Today's date: 2022  Patient name: Georgie Gusman  : 1945  MRN: [id number]  Referring provider: Valorie Johnson MD  Dx:   Encounter Diagnosis     ICD-10-CM    1  Trigger finger, right ring finger  M65 341                   Subjective: I think the shot helped; it moves better  Objective: See treatment diary below      Assessment: Tolerated treatment well  R RF triggered during therapy today, and if she does a lot of gripping at home it triggers if she doesn't wear her splint  Patient exhibited good technique with therapeutic exercises      Plan:   Pt will cont with HEP  F/U with dr yepez       Precautions:    Avoid composite flexion and resistance/repetitive gripping until trigger is reduced    Manuals 5/3 5/18 3/4 3/9 3/16 3/23 3/30 4/6 4/19 4/26   IASTM RRF, LLF  10' 10' 10 10' 10'  10' 3' 10'                                          Neuro Re-Ed                                                                                                        Ther Ex             RE 10'            Passive composite flexion , active extension 8' 8' 8' 20x B hands 8' 8' 10 w/ return demo on ea hand 15' 15' 15'   MP extension from tabletop with stretch x10 3' 3' 20x B hands 5' 5' 5' 5' x10 5'   Hook fist b/l   2x Pegs pegs pegs       EDC b/l Yellow web x20 Red web 20x  b/l RB 1x Red x20 B Red x20 Red 20x   Yellow ext squeeze Yellow web 20x   Isometric FDS    Jar x15 B hands Jar x15 b/l Jar x15 b/l       Digi-flex Yellow 2x10 R 20x left only       Yellow x10 Y 2x10                Ther Activity             Sm item manip Key pegs w/ 1 marble (l only) Reddy pegs w/ 1 marble Left only     Keypegs 1x translating palm </> tip Keypegs 1x translating palm </> tip    Keypegs 1x translating, remove alt digits  Key pegs w/ 1 marble (l only)   Theraputty- , roll, pinch         Yellow    Towel scrunches x10        x10                              Modalities             MHP             Paraffin bath B hands 10' 10' 10' 10' 10' 10' 10' 10' 10' 10'   Ortho fit & train

## 2022-05-24 ENCOUNTER — APPOINTMENT (OUTPATIENT)
Dept: OCCUPATIONAL THERAPY | Facility: CLINIC | Age: 77
End: 2022-05-24
Payer: MEDICARE

## 2022-05-26 ENCOUNTER — APPOINTMENT (OUTPATIENT)
Dept: RADIOLOGY | Age: 77
End: 2022-05-26
Payer: MEDICARE

## 2022-05-26 ENCOUNTER — HOSPITAL ENCOUNTER (OUTPATIENT)
Dept: RADIOLOGY | Age: 77
Discharge: HOME | End: 2022-05-26
Payer: MEDICARE

## 2022-05-26 DIAGNOSIS — M25.551 RIGHT HIP PAIN: ICD-10-CM

## 2022-05-26 DIAGNOSIS — R10.31 ABDOMINAL PAIN, RLQ: ICD-10-CM

## 2022-05-26 DIAGNOSIS — M08.851: ICD-10-CM

## 2022-05-26 PROCEDURE — 73502 X-RAY EXAM HIP UNI 2-3 VIEWS: CPT

## 2022-05-27 ENCOUNTER — HOSPITAL ENCOUNTER (OUTPATIENT)
Dept: RADIOLOGY | Age: 77
Discharge: HOME | End: 2022-05-27
Payer: MEDICARE

## 2022-05-27 DIAGNOSIS — R10.31 ABDOMINAL PAIN, RLQ: ICD-10-CM

## 2022-05-27 PROCEDURE — 74176 CT ABD & PELVIS W/O CONTRAST: CPT

## 2022-05-27 PROCEDURE — G1004 CDSM NDSC: HCPCS

## 2022-07-08 ENCOUNTER — EVALUATION (OUTPATIENT)
Dept: PHYSICAL THERAPY | Facility: MEDICAL CENTER | Age: 77
End: 2022-07-08
Payer: MEDICARE

## 2022-07-08 DIAGNOSIS — M25.552 LEFT HIP PAIN: ICD-10-CM

## 2022-07-08 DIAGNOSIS — M25.551 RIGHT HIP PAIN: Primary | ICD-10-CM

## 2022-07-08 PROCEDURE — 97161 PT EVAL LOW COMPLEX 20 MIN: CPT

## 2022-07-08 PROCEDURE — 97110 THERAPEUTIC EXERCISES: CPT

## 2022-07-08 NOTE — PROGRESS NOTES
PT Evaluation     Today's date: 2022  Patient name: Tamie De Leon  : 1945  MRN: [id number]  Referring provider: Sonia Ferrell DO  Dx:   Encounter Diagnosis     ICD-10-CM    1  Right hip pain  M25 551    2  Left hip pain  M25 552        Start Time: 1035  Stop Time: 1120  Total time in clinic (min): 45 minutes    Assessment  Assessment details: Roberto Carlos Siddiqui is a 76y/o female who presents to OP PT with a a referral from Dr Kirill Spaulding with a medical diagnosis of pain in left hip, pain in right hip  Upon examination pt presents with decreased bilateral hip and lumbar ROM, decreased bilateral LE strength, decreased functional mobility, decreased muscular endurance,and increased pain  Previously mentioned deficits have impaired patients ability to perform ADLs, recreational activities and house hold duties  Pt was educated on examination findings, diagnosis, prognosis, home exercise program to complete  Pt states understanding and consents to skilled PT services  Pt is a good candidate for skilled PT services  Positive prognositic indicators include desire to improve and active lifestyle  Negative prognostic indicators include BMI, chronicity of issue  Pt would benefit from skilled PT services to address functional deficits to return to PLOF  Impairments: abnormal gait, abnormal muscle firing, abnormal or restricted ROM, abnormal movement, activity intolerance, impaired physical strength, lacks appropriate home exercise program, pain with function and poor body mechanics    Symptom irritability: moderateUnderstanding of Dx/Px/POC: good   Prognosis: good    Goals  STG  2-6 weeks    1  Patient will demonstrate increased hip abduction strength to 4+/5 to improve gait mechanics in midstance and pre-swing stages    2  Patient will be able to perform deep squat and return to standing without pain     3  Pt will improve ROM by 25%     LTG  6 weeks to discharge    1       Patient will be independent with HEP for continued progress  2      Patient will demonstrate 5/5 in all hip  MMT to improve tolerance for performing work and recreational activities and allow for return to prolonged community ambulation    3  Patient will attain FOTO score of anticipated  or greater at time of discharge  4      Patient will return to PLOF in all recreational activities, ADLs and work duties  Plan  Patient would benefit from: PT eval and skilled physical therapy  Referral necessary: No  Planned modality interventions: cryotherapy, TENS, thermotherapy: hydrocollator packs, high voltage pulsed current: pain management and high voltage pulsed current: spasm management  Planned therapy interventions: joint mobilization, manual therapy, massage, ADL training, balance, neuromuscular re-education, patient education, strengthening, stretching, therapeutic activities, therapeutic exercise, flexibility, functional ROM exercises, gait training, abdominal trunk stabilization, activity modification, ADL retraining, Montiel taping, aquatic therapy, balance/weight bearing training, behavior modification, orthotic fitting/training, orthotic management and training, motor coordination training, muscle pump exercises, body mechanics training, postural training, graded activity, graded exercise, dry needling, graded motor and home exercise program  Other planned therapy interventions: IASTM, (-) pressure STM  Frequency: 2x week  Plan of Care beginning date: 7/8/2022  Plan of Care expiration date: 9/16/2022  Treatment plan discussed with: patient        Subjective Evaluation    History of Present Illness  Mechanism of injury: Subjective: Patient states a history of bilateral hip pain, which has increased over the past year  She also notes bilateral low back pain  She states recently she has developed right groin pain as well  Patient states she tries to remain active, attending silver sneakers and the [facility]   Patient has multiple trips planned over the next two months  Pain  Type: sharp, stabbing  Current:  2/10  Best: 2/10  Worst: 8/10  Alleviates: ice, tylenol  Aggravates: sit to stand, getting in and out of car, sleeping on back    Occupation: retired    Imaging: IMPRESSION:     No acute osseous abnormality      Mild bilateral hip degenerative changes more prominent on the right  PMH:  GERD, BMI    Previous Surgeries: see file    Previous Physical Activity: silver sneakers, [employer] daily    Current Medications: see file    MATY: insidious    Surgery Date: n/a     MD: Dr Stephen Allen    Patient Goals: increase activity without pain               Objective     Concurrent Complaints  Negative for night pain, disturbed sleep, bladder dysfunction, bowel dysfunction and saddle (S4) numbness    Palpation   Left   Tenderness of the gluteus medius  Right   Tenderness of the gluteus medius and piriformis  Trigger point to gluteus medius and piriformis  Tenderness     Left Hip   Tenderness in the PSIS  Right Hip   Tenderness in the PSIS  Active Range of Motion     Lumbar   Flexion:  with pain Restriction level: moderate  Extension:  Restriction level: moderate  Left lateral flexion:  Restriction level: moderate  Right lateral flexion:  Restriction level: moderate  Left rotation:  Restriction level: moderate  Right rotation:  Restriction level: moderate  Left Hip   Flexion: 75 degrees   Abduction: 40 degrees     Right Hip   Flexion: 75 degrees   Abduction: 40 degrees     Passive Range of Motion   Left Hip   Flexion: 90 degrees   External rotation (90/90): 50 degrees   Internal rotation (90/90): 35 degrees     Right Hip   Flexion: 90 degrees   External rotation (90/90): 35 degrees with pain  Internal rotation (90/90): 15 degrees with pain    Joint Play     Right Hip   Joints within functional limits are the lateral hip capsule and long axis distraction       Pain: L4, L5 and S1     Strength/Myotome Testing     Left Hip Planes of Motion   Flexion: 3+  Extension: 3+  Abduction: 3+  External rotation: 3+  Internal rotation: 3+    Right Hip   Planes of Motion   Flexion: 3+  Extension: 3+  Abduction: 3+  External rotation: 3+  Internal rotation: 3+    Left Knee   Flexion: 4  Extension: 4    Right Knee   Flexion: 4  Extension: 4    Tests     Lumbar     Left   Negative crossed SLR and passive SLR  Right   Negative crossed SLR and passive SLR  Right Hip   Positive long sit       Additional Tests Details  +R ant rotated innominate             Precautions: GERD      Manuals 7/8/2022              PROM             MET             STM                          Neuro Re-Ed             Bridge HP            Clamshell                                                                              Ther Ex             Supine HSS HEP            BIke             LAQ             Adductor Stretch HEP                                                                Ther Activity                                       Gait Training                                       Modalities

## 2022-07-08 NOTE — LETTER
2022    Charbel Palacios DO  [facility]    Patient: Sheila Crouch   YOB: 1945   Date of Visit: 2022     Encounter Diagnosis     ICD-10-CM    1  Right hip pain  M25 551    2  Left hip pain  M25 552        Dear Dr Leslie Loja:    Thank you for your recent referral of Sheila Crouch  Please review the attached evaluation summary from Yumiko's recent visit  Please verify that you agree with the plan of care by signing the attached order  If you have any questions or concerns, please do not hesitate to call  I sincerely appreciate the opportunity to share in the care of one of your patients and hope to have another opportunity to work with you in the near future  Sincerely,    Connie Martin, PT      Referring Provider:      I certify that I have read the below Plan of Care and certify the need for these services furnished under this plan of treatment while under my care  Charbel Palacios DO  [place] [id number] [place] [address]  Via Fax: [phone]          PT Evaluation     Today's date: 2022  Patient name: Sheila Crouch  : 1945  MRN: [id number]  Referring provider: Sheryl Monreal DO  Dx:   Encounter Diagnosis     ICD-10-CM    1  Right hip pain  M25 551    2  Left hip pain  M25 552        Start Time: 1035  Stop Time: 1120  Total time in clinic (min): 45 minutes    Assessment  Assessment details: Yoni Sandoval is a 78y/o female who presents to OP PT with a a referral from Dr Leslie Loja with a medical diagnosis of pain in left hip, pain in right hip  Upon examination pt presents with decreased bilateral hip and lumbar ROM, decreased bilateral LE strength, decreased functional mobility, decreased muscular endurance,and increased pain  Previously mentioned deficits have impaired patients ability to perform ADLs, recreational activities and house hold duties   Pt was educated on examination findings, diagnosis, prognosis, home exercise program to complete  Pt states understanding and consents to skilled PT services  Pt is a good candidate for skilled PT services  Positive prognositic indicators include desire to improve and active lifestyle  Negative prognostic indicators include BMI, chronicity of issue  Pt would benefit from skilled PT services to address functional deficits to return to PLOF  Impairments: abnormal gait, abnormal muscle firing, abnormal or restricted ROM, abnormal movement, activity intolerance, impaired physical strength, lacks appropriate home exercise program, pain with function and poor body mechanics    Symptom irritability: moderateUnderstanding of Dx/Px/POC: good   Prognosis: good    Goals  STG  2-6 weeks    1  Patient will demonstrate increased hip abduction strength to 4+/5 to improve gait mechanics in midstance and pre-swing stages    2  Patient will be able to perform deep squat and return to standing without pain     3  Pt will improve ROM by 25%     LTG  6 weeks to discharge    1  Patient will be independent with HEP for continued progress  2      Patient will demonstrate 5/5 in all hip  MMT to improve tolerance for performing work and recreational activities and allow for return to prolonged community ambulation    3  Patient will attain FOTO score of anticipated  or greater at time of discharge  4      Patient will return to PLOF in all recreational activities, ADLs and work duties        Plan  Patient would benefit from: PT eval and skilled physical therapy  Referral necessary: No  Planned modality interventions: cryotherapy, TENS, thermotherapy: hydrocollator packs, high voltage pulsed current: pain management and high voltage pulsed current: spasm management  Planned therapy interventions: joint mobilization, manual therapy, massage, ADL training, balance, neuromuscular re-education, patient education, strengthening, stretching, therapeutic activities, therapeutic exercise, flexibility, functional ROM exercises, gait training, abdominal trunk stabilization, activity modification, ADL retraining, Montiel taping, aquatic therapy, balance/weight bearing training, behavior modification, orthotic fitting/training, orthotic management and training, motor coordination training, muscle pump exercises, body mechanics training, postural training, graded activity, graded exercise, dry needling, graded motor and home exercise program  Other planned therapy interventions: IASTM, (-) pressure STM  Frequency: 2x week  Plan of Care beginning date: 7/8/2022  Plan of Care expiration date: 9/16/2022  Treatment plan discussed with: patient        Subjective Evaluation    History of Present Illness  Mechanism of injury: Subjective: Patient states a history of bilateral hip pain, which has increased over the past year  She also notes bilateral low back pain  She states recently she has developed right groin pain as well  Patient states she tries to remain active, attending [employer] sne[employer] and the [employer]  Patient has multiple trips planned over the next two months  Pain  Type: sharp, stabbing  Current:  2/10  Best: 2/10  Worst: 8/10  Alleviates: ice, tylenol  Aggravates: sit to stand, getting in and out of car, sleeping on back    Occupation: retired    Imaging: IMPRESSION:     No acute osseous abnormality      Mild bilateral hip degenerative changes more prominent on the right  PMH:  GERD, BMI    Previous Surgeries: see file    Previous Physical Activity: silver sneakers, [employer] daily    Current Medications: see file    MATY: insidious    Surgery Date: n/a     MD: Dr Jj Villegas    Patient Goals: increase activity without pain               Objective     Concurrent Complaints  Negative for night pain, disturbed sleep, bladder dysfunction, bowel dysfunction and saddle (S4) numbness    Palpation   Left   Tenderness of the gluteus medius       Right   Tenderness of the gluteus medius and piriformis  Trigger point to gluteus medius and piriformis  Tenderness     Left Hip   Tenderness in the PSIS  Right Hip   Tenderness in the PSIS  Active Range of Motion     Lumbar   Flexion:  with pain Restriction level: moderate  Extension:  Restriction level: moderate  Left lateral flexion:  Restriction level: moderate  Right lateral flexion:  Restriction level: moderate  Left rotation:  Restriction level: moderate  Right rotation:  Restriction level: moderate  Left Hip   Flexion: 75 degrees   Abduction: 40 degrees     Right Hip   Flexion: 75 degrees   Abduction: 40 degrees     Passive Range of Motion   Left Hip   Flexion: 90 degrees   External rotation (90/90): 50 degrees   Internal rotation (90/90): 35 degrees     Right Hip   Flexion: 90 degrees   External rotation (90/90): 35 degrees with pain  Internal rotation (90/90): 15 degrees with pain    Joint Play     Right Hip   Joints within functional limits are the lateral hip capsule and long axis distraction  Pain: L4, L5 and S1     Strength/Myotome Testing     Left Hip   Planes of Motion   Flexion: 3+  Extension: 3+  Abduction: 3+  External rotation: 3+  Internal rotation: 3+    Right Hip   Planes of Motion   Flexion: 3+  Extension: 3+  Abduction: 3+  External rotation: 3+  Internal rotation: 3+    Left Knee   Flexion: 4  Extension: 4    Right Knee   Flexion: 4  Extension: 4    Tests     Lumbar     Left   Negative crossed SLR and passive SLR  Right   Negative crossed SLR and passive SLR  Right Hip   Positive long sit       Additional Tests Details  +R ant rotated innominate             Precautions: GERD      Manuals 7/8/2022              PROM             MET             STM                          Neuro Re-Ed             Bridge HP            Clamshell                                                                              Ther Ex             Supine HSS HEP            BIke             LAQ             Adductor Stretch HEP Ther Activity                                       Gait Training                                       Modalities

## 2022-07-12 ENCOUNTER — OFFICE VISIT (OUTPATIENT)
Dept: PHYSICAL THERAPY | Facility: MEDICAL CENTER | Age: 77
End: 2022-07-12
Payer: MEDICARE

## 2022-07-12 DIAGNOSIS — M25.551 RIGHT HIP PAIN: Primary | ICD-10-CM

## 2022-07-12 DIAGNOSIS — M25.552 LEFT HIP PAIN: ICD-10-CM

## 2022-07-12 PROCEDURE — 97140 MANUAL THERAPY 1/> REGIONS: CPT

## 2022-07-12 PROCEDURE — 97110 THERAPEUTIC EXERCISES: CPT

## 2022-07-12 PROCEDURE — 97112 NEUROMUSCULAR REEDUCATION: CPT

## 2022-07-12 NOTE — PROGRESS NOTES
Daily Note     Today's date: 2022  Patient name: Sherrill Hinton  : 1945  MRN: [id number]  Referring provider: Keren Duncan DO  Dx:   Encounter Diagnosis     ICD-10-CM    1  Right hip pain  M25 551    2  Left hip pain  M25 552        Start Time: 728  Stop Time: 811  Total time in clinic (min): 43 minutes    Subjective: Pt states she has been compliant with HEP  She states exercise is easier to perform on L vs R  Objective: See treatment diary below  + R ant rotated innominate    Assessment: Tolerated treatment well  Rotated innominate corrected by MET this session  Isometric hip adduction improved patient R groin pain this session  Patient educated to incorporate adductor stretch and clamshells as part of HEP  Patient demonstrated fatigue post treatment, exhibited good technique with therapeutic exercises and would benefit from continued PT      Plan: Continue per plan of care        Precautions: GERD      Manuals 2022             PROM  BL Hip            MET  scissor           STM             Jt Mob  Long axis traction           Neuro Re-Ed             Bridge HP 3x10           Clamshell  3x10 BL           Hip Add Iso  5" 2x10--HEP                                                               Ther Ex             Supine HSS HEP 30" 3x BL           BIke             LAQ  5# 3x10 BL           Adductor Stretch HEP 30" 3x BL                                                               Ther Activity                                       Gait Training                                       Modalities

## 2022-07-15 ENCOUNTER — OFFICE VISIT (OUTPATIENT)
Dept: PHYSICAL THERAPY | Facility: MEDICAL CENTER | Age: 77
End: 2022-07-15
Payer: MEDICARE

## 2022-07-15 DIAGNOSIS — M25.551 RIGHT HIP PAIN: Primary | ICD-10-CM

## 2022-07-15 DIAGNOSIS — M25.552 LEFT HIP PAIN: ICD-10-CM

## 2022-07-15 PROCEDURE — 97140 MANUAL THERAPY 1/> REGIONS: CPT

## 2022-07-15 PROCEDURE — 97110 THERAPEUTIC EXERCISES: CPT

## 2022-07-15 PROCEDURE — 97112 NEUROMUSCULAR REEDUCATION: CPT

## 2022-07-15 NOTE — PROGRESS NOTES
Daily Note     Today's date: 7/15/2022  Patient name: Jeff Mackenzie  : 1945  MRN: [id number]  Referring provider: Griselda Taylor DO  Dx:   Encounter Diagnosis     ICD-10-CM    1  Right hip pain  M25 551    2  Left hip pain  M25 552        Start Time: 815  Stop Time:   Total time in clinic (min): 40 minutes    Subjective: Pt states an increase in soreness and stiffness after spreading mulch yesterday  Objective: See treatment diary below      Assessment:  Patient denies pain with exercise today, tolerates increased resistance with clamshell exercise  Patient demonstrated fatigue post treatment, exhibited good technique with therapeutic exercises and would benefit from continued PT      Plan: Continue per plan of care  Progress treatment as tolerated         Precautions: GERD      Manuals 2022   2022   7/15/2022            PROM  BL Hip  Bl Hip          MET  scissor           STM             Jt Mob  Long axis traction Long axis  traction          Neuro Re-Ed             Bridge HP 3x10 3x10          Clamshell  3x10 BL 3x10 GTB BL          Hip Add Iso  5" 2x10--HEP 5" 2x10                                                              Ther Ex             Supine HSS HEP 30" 3x BL 30" 3x BL          BIke   5'          LAQ  5# 3x10 BL 5# 3x10 BL          Adductor Stretch HEP 30" 3x BL 30" 3x BL          It Band Stretch   30" 3x BL                                                 Ther Activity                                       Gait Training                                       Modalities

## 2022-07-18 ENCOUNTER — OFFICE VISIT (OUTPATIENT)
Dept: PHYSICAL THERAPY | Facility: MEDICAL CENTER | Age: 77
End: 2022-07-18
Payer: MEDICARE

## 2022-07-18 DIAGNOSIS — M25.551 RIGHT HIP PAIN: Primary | ICD-10-CM

## 2022-07-18 DIAGNOSIS — M25.552 LEFT HIP PAIN: ICD-10-CM

## 2022-07-18 PROCEDURE — 97112 NEUROMUSCULAR REEDUCATION: CPT

## 2022-07-18 PROCEDURE — 97110 THERAPEUTIC EXERCISES: CPT

## 2022-07-18 PROCEDURE — 97140 MANUAL THERAPY 1/> REGIONS: CPT

## 2022-07-18 NOTE — PROGRESS NOTES
Daily Note     Today's date: 2022  Patient name: Alireza Muniz  : 1945  MRN: [id number]  Referring provider: Rama Rehman DO  Dx: No diagnosis found  Subjective: Pt states "I am having a pretty bad day, it might be the weather "       Objective: See treatment diary below      Assessment: Tolerated treatment well  Pt states improvement in stiffness at end of session  SLR ROM modified on R LE secondary to pain  Patient demonstrated fatigue post treatment, exhibited good technique with therapeutic exercises and would benefit from continued PT      Plan: Continue per plan of care  Progress treatment as tolerated         Precautions: GERD      Manuals 2022   2022   7/15/2022   2022           PROM  BL Hip  Bl Hip BL Hip         MET  scissor           STM             Jt Mob  Long axis traction Long axis  traction Long axis traction         Neuro Re-Ed             Bridge HP 3x10 3x10 3x10         Clamshell  3x10 BL 3x10 GTB BL 3x10 GTB BL 5"         Hip Add Iso  5" 2x10--HEP 5" 2x10 5" 3x10         SLR    3x10 BL                                                Ther Ex             Supine HSS HEP 30" 3x BL 30" 3x BL 30" 3x BL         BIke   5' 5'         LAQ  5# 3x10 BL 5# 3x10 BL 7# 3x10         Adductor Stretch HEP 30" 3x BL 30" 3x BL 30" 3x BL         It Band Stretch   30" 3x BL NP                                                Ther Activity                                       Gait Training                                       Modalities

## 2022-07-22 ENCOUNTER — OFFICE VISIT (OUTPATIENT)
Dept: PHYSICAL THERAPY | Facility: MEDICAL CENTER | Age: 77
End: 2022-07-22
Payer: MEDICARE

## 2022-07-22 DIAGNOSIS — M25.551 RIGHT HIP PAIN: Primary | ICD-10-CM

## 2022-07-22 DIAGNOSIS — M25.552 LEFT HIP PAIN: ICD-10-CM

## 2022-07-22 PROCEDURE — 97140 MANUAL THERAPY 1/> REGIONS: CPT

## 2022-07-22 NOTE — PROGRESS NOTES
Daily Note     Today's date: 2022  Patient name: Everette Arriola  : 1945  MRN: [id number]  Referring provider: Timothy Gregorio DO  Dx:   Encounter Diagnosis     ICD-10-CM    1  Right hip pain  M25 551    2  Left hip pain  M25 552        Start Time: 735  Stop Time: 0815  Total time in clinic (min): 40 minutes    Subjective: Pt states an increase in bilateral LE soreness  She states she had some pain while sitting on the bus for a day trip  Objective: See treatment diary below      Assessment: Tolerated treatment well  Patient denies pain with new exercise  Pt was educated to increase activity to tolerance  She states understanding and agrees  Patient demonstrated fatigue post treatment, exhibited good technique with therapeutic exercises and would benefit from continued PT      Plan: Continue per plan of care  Progress treatment as tolerated         Precautions: GERD    PT 1:1 [id number]  Manuals 2022   2022   7/15/2022   2022   2022          PROM  BL Hip  Bl Hip BL Hip BL Hip         MET  scissor           STM             Jt Mob  Long axis traction Long axis  traction Long axis traction Long axis traction        Neuro Re-Ed             Bridge HP 3x10 3x10 3x10 3x10        Clamshell  3x10 BL 3x10 GTB BL 3x10 GTB BL 5" 3x10 GTB BL        Hip Add Iso  5" 2x10--HEP 5" 2x10 5" 3x10 5" 3x10        SLR    3x10 BL 3x10 BL                                               Ther Ex             Supine HSS HEP 30" 3x BL 30" 3x BL 30" 3x BL 30" 3x BL        BIke   5' 5' 5'        LAQ  5# 3x10 BL 5# 3x10 BL 7# 3x10 7# 3x10 BL        Adductor Stretch HEP 30" 3x BL 30" 3x BL 30" 3x BL 30" 3x BL        It Band Stretch   30" 3x BL NP         LP     85# 3x10                                  Ther Activity                                       Gait Training                                       Modalities

## 2022-07-25 ENCOUNTER — OFFICE VISIT (OUTPATIENT)
Dept: URGENT CARE | Facility: MEDICAL CENTER | Age: 77
End: 2022-07-25
Payer: MEDICARE

## 2022-07-25 VITALS
WEIGHT: 190 LBS | TEMPERATURE: 98 F | OXYGEN SATURATION: 95 % | RESPIRATION RATE: 18 BRPM | BODY MASS INDEX: 29.82 KG/M2 | HEART RATE: 68 BPM | HEIGHT: 67 IN

## 2022-07-25 DIAGNOSIS — J06.9 ACUTE URI: Primary | ICD-10-CM

## 2022-07-25 PROCEDURE — G0463 HOSPITAL OUTPT CLINIC VISIT: HCPCS | Performed by: PHYSICIAN ASSISTANT

## 2022-07-25 PROCEDURE — 87636 SARSCOV2 & INF A&B AMP PRB: CPT | Performed by: PHYSICIAN ASSISTANT

## 2022-07-25 PROCEDURE — 99213 OFFICE O/P EST LOW 20 MIN: CPT | Performed by: PHYSICIAN ASSISTANT

## 2022-07-25 RX ORDER — BENZONATATE 100 MG/1
100 CAPSULE ORAL 3 TIMES DAILY PRN
Qty: 20 CAPSULE | Refills: 0 | Status: SHIPPED | OUTPATIENT
Start: 2022-07-25

## 2022-07-25 RX ORDER — AZITHROMYCIN 250 MG/1
TABLET, FILM COATED ORAL
Qty: 6 TABLET | Refills: 0 | Status: SHIPPED | OUTPATIENT
Start: 2022-07-25 | End: 2022-07-29

## 2022-07-25 NOTE — PROGRESS NOTES
[facility]        NAME: Oziel Boston is a 78 y o  female  : 1945    MRN: [id number]  DATE: 2022  TIME: 8:32 AM    Assessment and Plan   Acute URI [J06 9]  1  Acute URI  Covid/Flu-Office Collect         Patient Instructions     Upper respiratory infection   COVID test sent   Tessalon Perles as directed for cough  Follow up with PCP in 3-5 days  Proceed to  ER if symptoms worsen  Chief Complaint     Chief Complaint   Patient presents with    Cough    Sore Throat    Ear Fullness     Started Friday          History of Present Illness       78year old female presents complaining of nonproductive cough x3 days  Patient denies chest pain, shortness breath, fevers  States that she was in contact with a COVID positive person 1 week ago      Review of Systems   Review of Systems   Constitutional: Negative for activity change, appetite change, chills, diaphoresis, fatigue and fever  HENT: Positive for congestion and rhinorrhea  Negative for ear discharge, ear pain, facial swelling, hearing loss, mouth sores, nosebleeds, postnasal drip, sinus pressure, sinus pain, sneezing, sore throat and voice change  Respiratory: Positive for cough  Negative for apnea, choking, chest tightness, shortness of breath, wheezing and stridor  Cardiovascular: Negative            Current Medications       Current Outpatient Medications:     Ascorbic Acid (vitamin C) 1000 MG tablet, Take 1,000 mg by mouth daily, Disp: , Rfl:     Cholecalciferol (Vitamin D3) 125 MCG (5000 UT) TABS, Take 5,000 Units by mouth daily, Disp: , Rfl:     co-enzyme Q-10 30 MG capsule, Take 30 mg by mouth 3 (three) times a day, Disp: , Rfl:     COLLAGEN PO, Take by mouth, Disp: , Rfl:     ELDERBERRY PO, Take by mouth, Disp: , Rfl:     estradiol (ESTRACE) 0 5 MG tablet, Every other day, Disp: 90 tablet, Rfl: 0    famotidine (PEPCID) 20 mg tablet, Take 1 tablet (20 mg total) by mouth in the morning , Disp: 90 tablet, Rfl: 3    Lactobacillus (Probiotic Acidophilus) TABS, Take by mouth, Disp: , Rfl:     Magnesium 250 MG TABS, Take by mouth, Disp: , Rfl:     metoprolol tartrate (LOPRESSOR) 25 mg tablet, Take 25 mg by mouth 2 (two) times a day, Disp: , Rfl:     metoprolol tartrate (LOPRESSOR) 50 mg tablet, Take 50 mg by mouth every 12 (twelve) hours (Patient not taking: Reported on 5/11/2022), Disp: , Rfl:     OREGANO PO, Take by mouth, Disp: , Rfl:     pyridoxine (VITAMIN B6) 100 mg tablet, Take 100 mg by mouth daily, Disp: , Rfl:     TURMERIC PO, Take by mouth, Disp: , Rfl:     Current Allergies     Allergies as of 07/25/2022    (No Known Allergies)            The following portions of the patient's history were reviewed and updated as appropriate: allergies, current medications, past family history, past medical history, past social history, past surgical history and problem list      Past Medical History:   Diagnosis Date    Arthritis     Diverticulosis     hx of diverticulitis    Dysphagia     GERD (gastroesophageal reflux disease)     hx of gerd but is not on RX    Hypertension     Irregular heart beat     gone after starting metoprolol    Post-nasal drip     uses flonase    Spinal stenosis in cervical region        Past Surgical History:   Procedure Laterality Date    CATARACT EXTRACTION      COLONOSCOPY      HYSTERECTOMY      age 41   [employer] OOPHORECTOMY Bilateral     age 41   [employer] SHOULDER DEBRIDEMENT      calcium deposits right side       Family History   Problem Relation Age of Onset    Breast cancer Mother 48    Heart failure Mother     Breast cancer Maternal Aunt 62    Other Father         brain tumor    No Known Problems Maternal Grandmother     No Known Problems Maternal Grandfather     No Known Problems Son     No Known Problems Maternal Aunt     No Known Problems Maternal Aunt     No Known Problems Maternal Aunt     No Known Problems Maternal Aunt          Medications have been verified  Objective   Pulse 68   Temp 98 °F (36 7 °C) (Temporal)   Resp 18   Ht 5' 7" (1 702 m)   Wt 86 2 kg (190 lb)   SpO2 95%   BMI 29 76 kg/m²        Physical Exam     Physical Exam  Constitutional:       Appearance: She is well-developed  HENT:      Head: Normocephalic and atraumatic  Right Ear: External ear normal       Left Ear: External ear normal       Nose: Nose normal       Mouth/Throat:      Pharynx: No oropharyngeal exudate  Cardiovascular:      Rate and Rhythm: Normal rate and regular rhythm  Heart sounds: Normal heart sounds  Pulmonary:      Effort: Pulmonary effort is normal  No respiratory distress  Breath sounds: Normal breath sounds  No wheezing or rales  Chest:      Chest wall: No tenderness  Musculoskeletal:      Cervical back: Normal range of motion and neck supple  Lymphadenopathy:      Cervical: No cervical adenopathy  Neurological:      Mental Status: She is alert

## 2022-07-25 NOTE — PATIENT INSTRUCTIONS
Upper respiratory infection   COVID test sent   Tessalon Perles as directed for cough  Follow up with PCP in 3-5 days  Proceed to  ER if symptoms worsen  [address]    Your healthcare provider and/or public health staff have evaluated you and have determined that you do not need to remain in the hospital at this time  At this time you can be isolated at home where you will be monitored by staff from your local or state health department  You should carefully follow the prevention and isolation steps below until a healthcare provider or local or state health department says that you can return to your normal activities  Stay home except to get medical care    People who are mildly ill with COVID-19 are able to isolate at home during their illness  You should restrict activities outside your home, except for getting medical care  Do not go to work, school, or public areas  Avoid using public transportation, ride-sharing, or taxis  Separate yourself from other people and animals in your home    People: As much as possible, you should stay in a specific room and away from other people in your home  Also, you should use a separate bathroom, if available  Animals: You should restrict contact with pets and other animals while you are sick with COVID-19, just like you would around other people  Although there have not been reports of pets or other animals becoming sick with COVID-19, it is still recommended that people sick with COVID-19 limit contact with animals until more information is known about the virus  When possible, have another member of your household care for your animals while you are sick  If you are sick with COVID-19, avoid contact with your pet, including petting, snuggling, being kissed or licked, and sharing food  If you must care for your pet or be around animals while you are sick, wash your hands before and after you interact with pets and wear a facemask   See [id number] and Animals for more information  Call ahead before visiting your doctor    If you have a medical appointment, call the healthcare provider and tell them that you have or may have COVID-19  This will help the healthcare providers office take steps to keep other people from getting infected or exposed  Wear a facemask    You should wear a facemask when you are around other people (e g , sharing a room or vehicle) or pets and before you enter a healthcare providers office  If you are not able to wear a facemask (for example, because it causes trouble breathing), then people who live with you should not stay in the same room with you, or they should wear a facemask if they enter your room  Cover your coughs and sneezes    Cover your mouth and nose with a tissue when you cough or sneeze  Throw used tissues in a lined trash can  Immediately wash your hands with soap and water for at least 20 seconds or, if soap and water are not available, clean your hands with an alcohol-based hand  that contains at least 60% alcohol  Clean your hands often    Wash your hands often with soap and water for at least 20 seconds, especially after blowing your nose, coughing, or sneezing; going to the bathroom; and before eating or preparing food  If soap and water are not readily available, use an alcohol-based hand  with at least 60% alcohol, covering all surfaces of your hands and rubbing them together until they feel dry  Soap and water are the best option if hands are visibly dirty  Avoid touching your eyes, nose, and mouth with unwashed hands  Avoid sharing personal household items    You should not share dishes, drinking glasses, cups, eating utensils, towels, or bedding with other people or pets in your home  After using these items, they should be washed thoroughly with soap and water      Clean all high-touch surfaces everyday    High touch surfaces include counters, tabletops, doorknobs, bathroom fixtures, toilets, phones, keyboards, tablets, and bedside tables  Also, clean any surfaces that may have blood, stool, or body fluids on them  Use a household cleaning spray or wipe, according to the label instructions  Labels contain instructions for safe and effective use of the cleaning product including precautions you should take when applying the product, such as wearing gloves and making sure you have good ventilation during use of the product  Monitor your symptoms    Seek prompt medical attention if your illness is worsening (e g , difficulty breathing)  Before seeking care, call your healthcare provider and tell them that you have, or are being evaluated for, COVID-19  Put on a facemask before you enter the facility  These steps will help the healthcare providers office to keep other people in the office or waiting room from getting infected or exposed  Ask your healthcare provider to call the local or [facility] health department  Persons who are placed under active monitoring or facilitated self-monitoring should follow instructions provided by their local health department or occupational health professionals, as appropriate  If you have a medical emergency and need to call 911, notify the dispatch personnel that you have, or are being evaluated for COVID-19  If possible, put on a facemask before emergency medical services arrive  Discontinuing home isolation    Patients with confirmed COVID-19 should remain under home isolation precautions until the following conditions are met:    They have had no fever for at least 24 hours (that is one full day of no fever without the use medicine that reduces fevers)  AND  other symptoms have improved (for example, when their cough or shortness of breath have improved)  AND  If had mild or moderate illness, at least 10 days have passed since their symptoms first appeared or if severe illness (needed oxygen) or immunosuppressed, at least 20 days have passed since symptoms first appeared  Patients with confirmed COVID-19 should also notify close contacts (including their workplace) and ask that they self-quarantine  Currently, close contact is defined as being within 6 feet for 15 minutes or more from the period 24 hours starting 48 hours before symptom onset to the time at which the patient went into isolation  Close contacts of patients diagnosed with COVID-19 should be instructed by the patient to self-quarantine for 14 days from the last time of their last contact with the patient       Source: [employer]

## 2022-07-26 ENCOUNTER — OFFICE VISIT (OUTPATIENT)
Dept: PHYSICAL THERAPY | Facility: MEDICAL CENTER | Age: 77
End: 2022-07-26
Payer: MEDICARE

## 2022-07-26 ENCOUNTER — TELEPHONE (OUTPATIENT)
Dept: URGENT CARE | Facility: MEDICAL CENTER | Age: 77
End: 2022-07-26

## 2022-07-26 DIAGNOSIS — M25.552 LEFT HIP PAIN: ICD-10-CM

## 2022-07-26 DIAGNOSIS — M25.551 RIGHT HIP PAIN: Primary | ICD-10-CM

## 2022-07-26 LAB
FLUAV RNA RESP QL NAA+PROBE: NEGATIVE
FLUBV RNA RESP QL NAA+PROBE: NEGATIVE
SARS-COV-2 RNA RESP QL NAA+PROBE: POSITIVE

## 2022-07-26 PROCEDURE — 97110 THERAPEUTIC EXERCISES: CPT

## 2022-07-26 PROCEDURE — 97140 MANUAL THERAPY 1/> REGIONS: CPT

## 2022-07-26 NOTE — PROGRESS NOTES
Daily Note     Today's date: 2022  Patient name: Kenia Macias  : 1945  MRN: [id number]  Referring provider: Zi Houston DO  Dx:   Encounter Diagnosis     ICD-10-CM    1  Right hip pain  M25 551    2  Left hip pain  M25 552        Start Time: 0830  Stop Time: 09  Total time in clinic (min): 35 minutes    Subjective: Pt states an increase in LE soreness after previous session, states an increase in left knee pain with leg press  Objective: See treatment diary below      Assessment: Tolerated treatment fair  Patient demonstrated fatigue post treatment, exhibited good technique with therapeutic exercises and would benefit from continued PT      Plan: Continue per plan of care  Progress treatment as tolerated         Precautions: GERD    PT 1:1 [id number]  Manuals 2022   2022   7/15/2022   2022   2022   2022         PROM  BL Hip  Bl Hip BL Hip BL Hip  BL Hip       MET  scissor           STM             Jt Mob  Long axis traction Long axis  traction Long axis traction Long axis traction hold       Neuro Re-Ed             Bridge HP 3x10 3x10 3x10 3x10 3x10       Clamshell  3x10 BL 3x10 GTB BL 3x10 GTB BL 5" 3x10 GTB BL 3x10 GTB BL       Hip Add Iso  5" 2x10--HEP 5" 2x10 5" 3x10 5" 3x10 5" 3x10       SLR    3x10 BL 3x10 BL 3x10 BL                                              Ther Ex             Supine HSS HEP 30" 3x BL 30" 3x BL 30" 3x BL 30" 3x BL 30" 3x BL       BIke   5' 5' 5' 5'       LAQ  5# 3x10 BL 5# 3x10 BL 7# 3x10 7# 3x10 BL 7# 3x10 BL       Adductor Stretch HEP 30" 3x BL 30" 3x BL 30" 3x BL 30" 3x BL 30" 3x BL       It Band Stretch   30" 3x BL NP         LP     85# 3x10 Held       Lat Walk      7# 10x laps at table                    Ther Activity                                       Gait Training                                       Modalities

## 2022-07-26 NOTE — TELEPHONE ENCOUNTER
Spoke with patient informed her of positive COVID 19 test results  Patient states she is asymptomatic at this moment  I have advised her to contact primary care doctor and discuss possible treatment  Patient verbalized understanding and states she will call

## 2022-07-29 ENCOUNTER — OFFICE VISIT (OUTPATIENT)
Dept: PHYSICAL THERAPY | Facility: MEDICAL CENTER | Age: 77
End: 2022-07-29
Payer: MEDICARE

## 2022-07-29 DIAGNOSIS — M25.552 LEFT HIP PAIN: ICD-10-CM

## 2022-07-29 DIAGNOSIS — M25.551 RIGHT HIP PAIN: Primary | ICD-10-CM

## 2022-07-29 PROCEDURE — 97112 NEUROMUSCULAR REEDUCATION: CPT

## 2022-07-29 PROCEDURE — 97140 MANUAL THERAPY 1/> REGIONS: CPT

## 2022-07-29 NOTE — PROGRESS NOTES
Daily Note     Today's date: 2022  Patient name: Petey Trevino  : 1945  MRN: [id number]  Referring provider: Nadia Goss DO  Dx:   Encounter Diagnosis     ICD-10-CM    1  Right hip pain  M25 551    2  Left hip pain  M25 552        Start Time: 0730  Stop Time: 0806  Total time in clinic (min): 36 minutes    Subjective: Pt states improvement in bilateral hip pain  Objective: See treatment diary below      Assessment: Tolerated treatment well  Patient demonstrated fatigue post treatment, exhibited good technique with therapeutic exercises and would benefit from continued PT      Plan: Continue per plan of care  Progress treatment as tolerated         Precautions: GERD    PT 1:1 [id number]  Manuals 2022   2022   7/15/2022   2022   2022   2022   2022        PROM  BL Hip  Bl Hip BL Hip BL Hip  BL Hip BL Hip      MET  scissor           STM             Jt Mob  Long axis traction Long axis  traction Long axis traction Long axis traction hold       Neuro Re-Ed             Bridge HP 3x10 3x10 3x10 3x10 3x10 3x10 w/ GTB      Clamshell  3x10 BL 3x10 GTB BL 3x10 GTB BL 5" 3x10 GTB BL 3x10 GTB BL 3x10 GTB      Hip Add Iso  5" 2x10--HEP 5" 2x10 5" 3x10 5" 3x10 5" 3x10 5" 3x10      SLR    3x10 BL 3x10 BL 3x10 BL 3x10 BL 2#                                             Ther Ex             Supine HSS HEP 30" 3x BL 30" 3x BL 30" 3x BL 30" 3x BL 30" 3x BL 30" 3x BL      BIke   5' 5' 5' 5' 5'      LAQ  5# 3x10 BL 5# 3x10 BL 7# 3x10 7# 3x10 BL 7# 3x10 BL 7# 3x10 BL      Adductor Stretch HEP 30" 3x BL 30" 3x BL 30" 3x BL 30" 3x BL 30" 3x BL 30" 3x BL      It Band Stretch   30" 3x BL NP         LP     85# 3x10 Held       Lat Walk      7# 10x laps at table 10x laps at table GTB                   Ther Activity                                       Gait Training                                       Modalities

## 2022-08-01 ENCOUNTER — OFFICE VISIT (OUTPATIENT)
Dept: PHYSICAL THERAPY | Facility: MEDICAL CENTER | Age: 77
End: 2022-08-01
Payer: MEDICARE

## 2022-08-01 DIAGNOSIS — M25.552 LEFT HIP PAIN: ICD-10-CM

## 2022-08-01 DIAGNOSIS — M25.551 RIGHT HIP PAIN: Primary | ICD-10-CM

## 2022-08-01 PROCEDURE — 97140 MANUAL THERAPY 1/> REGIONS: CPT

## 2022-08-01 PROCEDURE — 97110 THERAPEUTIC EXERCISES: CPT

## 2022-08-01 PROCEDURE — 97112 NEUROMUSCULAR REEDUCATION: CPT

## 2022-08-01 NOTE — PROGRESS NOTES
Daily Note     Today's date: 2022  Patient name: Sarika Yuan  : 1945  MRN: [id number]  Referring provider: Williams Cabrales DO  Dx:   Encounter Diagnosis     ICD-10-CM    1  Right hip pain  M25 551    2  Left hip pain  M25 552        Start Time: 1357  Stop Time: 1435  Total time in clinic (min): 38 minutes    Subjective: Pt states she experienced her R hip giving way after previous session  She states she did significatnly increase activity and was cleaning her home vigorously  Objective: See treatment diary below      Assessment: Tolerated treatment well  No complaints of "giving way" during session, patient denies pain wit cable column walkouts this session  Patient continues to demonstrate improved strength via increased resistance  Patient demonstrated fatigue post treatment, exhibited good technique with therapeutic exercises and would benefit from continued PT      Plan: Continue per plan of care  Progress treatment as tolerated         Precautions: GERD    PT 1:1   Manuals 2022   2022   7/15/2022   2022   2022   2022   2022   2022       PROM  BL Hip  Bl Hip BL Hip BL Hip  BL Hip BL Hip Bl HIP     MET  scissor           STM             Jt Mob  Long axis traction Long axis  traction Long axis traction Long axis traction hold       Neuro Re-Ed             Bridge HP 3x10 3x10 3x10 3x10 3x10 3x10 w/ GTB 3x10 w/ GTB     Clamshell  3x10 BL 3x10 GTB BL 3x10 GTB BL 5" 3x10 GTB BL 3x10 GTB BL 3x10 GTB 3x10 GTB     Hip Add Iso  5" 2x10--HEP 5" 2x10 5" 3x10 5" 3x10 5" 3x10 5" 3x10 5" 3x10     SLR    3x10 BL 3x10 BL 3x10 BL 3x10 BL 2# 3x10 BL 2 5#                                            Ther Ex             Supine HSS HEP 30" 3x BL 30" 3x BL 30" 3x BL 30" 3x BL 30" 3x BL 30" 3x BL 30" 3x BL     BIke   5' 5' 5' 5' 5' 5'      LAQ  5# 3x10 BL 5# 3x10 BL 7# 3x10 7# 3x10 BL 7# 3x10 BL 7# 3x10 BL 10# 3x10 BL     Adductor Stretch HEP 30" 3x BL 30" 3x BL 30" 3x BL 30" 3x BL 30" 3x BL 30" 3x BL 30" 3x BL     It Band Stretch   30" 3x BL NP         LP     85# 3x10 Held       Lat Walk      7# 10x laps at table 10x laps at table GTB 10x laps at table GTB     CC walk out        10x laps 50#      Ther Activity                                       Gait Training                                       Modalities

## 2022-08-08 ENCOUNTER — OFFICE VISIT (OUTPATIENT)
Dept: PHYSICAL THERAPY | Facility: MEDICAL CENTER | Age: 77
End: 2022-08-08
Payer: MEDICARE

## 2022-08-08 DIAGNOSIS — M25.551 RIGHT HIP PAIN: Primary | ICD-10-CM

## 2022-08-08 DIAGNOSIS — M25.552 LEFT HIP PAIN: ICD-10-CM

## 2022-08-08 PROCEDURE — 97110 THERAPEUTIC EXERCISES: CPT
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
## 2022-08-11 ENCOUNTER — OFFICE VISIT (OUTPATIENT)
Dept: GASTROENTEROLOGY | Facility: CLINIC | Age: 77
End: 2022-08-11
Payer: MEDICARE

## 2022-08-11 DIAGNOSIS — R13.19 ESOPHAGEAL DYSPHAGIA: ICD-10-CM

## 2022-08-11 DIAGNOSIS — K21.9 GASTROESOPHAGEAL REFLUX DISEASE WITHOUT ESOPHAGITIS: ICD-10-CM

## 2022-08-11 DIAGNOSIS — E03.4 HYPOTHYROIDISM DUE TO ACQUIRED ATROPHY OF THYROID: ICD-10-CM

## 2022-08-11 DIAGNOSIS — Z86.010 HX OF ADENOMATOUS COLONIC POLYPS: ICD-10-CM

## 2022-08-11 DIAGNOSIS — E66.3 OVERWEIGHT: ICD-10-CM

## 2022-08-11 DIAGNOSIS — R10.31 RIGHT LOWER QUADRANT PAIN: ICD-10-CM

## 2022-08-11 DIAGNOSIS — K62.5 RECTAL BLEEDING: Primary | ICD-10-CM

## 2022-08-11 PROCEDURE — 99214 OFFICE O/P EST MOD 30 MIN: CPT | Performed by: INTERNAL MEDICINE

## 2022-08-11 RX ORDER — LIDOCAINE HYDROCHLORIDE 10 MG/ML
INJECTION, SOLUTION INFILTRATION; PERINEURAL
COMMUNITY

## 2022-08-11 RX ORDER — BETAMETHASONE SODIUM PHOSPHATE AND BETAMETHASONE ACETATE 3; 3 MG/ML; MG/ML
INJECTION, SUSPENSION INTRA-ARTICULAR; INTRALESIONAL; INTRAMUSCULAR; SOFT TISSUE
COMMUNITY

## 2022-08-11 RX ORDER — HYOSCYAMINE SULFATE 0.12 MG/1
TABLET SUBLINGUAL
COMMUNITY

## 2022-08-11 RX ORDER — HYALURONATE SODIUM 20 MG/2 ML
4 SYRINGE (ML) INTRAARTICULAR
COMMUNITY

## 2022-08-11 RX ORDER — FAMOTIDINE 20 MG/1
20 TABLET, FILM COATED ORAL DAILY
Qty: 90 TABLET | Refills: 3 | Status: SHIPPED | OUTPATIENT
Start: 2022-08-11

## 2022-08-11 NOTE — PROGRESS NOTES
[facility] - Outpatient Follow-up Note  Sherrill Hinton 78 y o  female MRN: [id number]  Encounter: [id number]          ASSESSMENT AND PLAN:      1  Rectal bleeding  Off and on discussed band ligation  Patient wishes to think about it and possibly do it in October when she completes her vacations     She does have vascular ectasias in the cecum but this is usually bright red blood short lived  She moves her bowels several times a day  - CBC; Future    2  Gastroesophageal reflux disease without esophagitis  Desires a refill of famotidine 20 mg daily which has improved and controlled her symptomatology  - famotidine (PEPCID) 20 mg tablet; Take 1 tablet (20 mg total) by mouth daily  Dispense: 90 tablet; Refill: 3    3  Esophageal dysphagia  Resolved with acid suppression    4  Hx of adenomatous colonic polyps  Next colonoscopy January of 2026    5  Overweight  Difficulty exercising due to arthritis of the hips  Will work on portion control   - TSH, 3rd generation with Free T4 reflex; Future    6  Hypothyroidism due to acquired atrophy of thyroid     - TSH, 3rd generation with Free T4 reflex; Future    7  Right lower quadrant pain  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  Her colon has stomach acute angulations in the sigmoid  Additionally 2 mm renal stone  Discussed symptomatology  Usually her right lower quadrant pain is short-lived and resolved with Levsin this is only required on occasion however when she presented for CT scan today before had some significant discomfort  I suggest follow-up of kidney stone with PCP or urologist   She will otherwise follow-up in 3 months     ______________________________________________________________________    SUBJECTIVE:  Very pleasant 81-year-old lady presents for follow-up  She has intermittent rectal bleeding we discussed band ligation at length    She has reflux which is controlled on famotidine her dysphagia has resolved  She does have a history of tubular adenomas colon cancer screening is up-to-date  There is a redundant colon  She does have rare right lower quadrant discomfort which response to Levsin SL  This is not escalating  She does have kidney stone on the right found on recent CT scan  There is also some AVMs of the cecum which appeared to be asymptomatic  No recent CBC  She moves her bowels several times a day  She does have acute angulations of the sigmoid sigmoid  REVIEW OF SYSTEMS IS OTHERWISE NEGATIVE        Historical Information   Past Medical History:   Diagnosis Date    Arthritis     Diverticulosis     hx of diverticulitis    Dysphagia     GERD (gastroesophageal reflux disease)     hx of gerd but is not on RX    Hypertension     Irregular heart beat     gone after starting metoprolol    Post-nasal drip     uses flonase    Spinal stenosis in cervical region      Past Surgical History:   Procedure Laterality Date    CATARACT EXTRACTION      COLONOSCOPY      HYSTERECTOMY      age 41   [facility] OOPHORECTOMY Bilateral     age 41   Heidi Nine SHOULDER DEBRIDEMENT      calcium deposits right side     Social History   Social History     Substance and Sexual Activity   Alcohol Use Yes    Comment: social     Social History     Substance and Sexual Activity   Drug Use Never     Social History     Tobacco Use   Smoking Status Never Smoker   Smokeless Tobacco Never Used     Family History   Problem Relation Age of Onset    Breast cancer Mother 48    Heart failure Mother     Breast cancer Maternal Aunt 62    Other Father         brain tumor    No Known Problems Maternal Grandmother     No Known Problems Maternal Grandfather     No Known Problems Son     No Known Problems Maternal Aunt     No Known Problems Maternal Aunt     No Known Problems Maternal Aunt     No Known Problems Maternal Aunt        Meds/Allergies       Current Outpatient Medications:     Ascorbic Acid (vitamin C) 1000 MG tablet    benzonatate (TESSALON PERLES) 100 mg capsule    betamethasone acetate-betamethasone sodium phosphate (CELESTONE) 6 (3-3) mg/mL    Cholecalciferol (Vitamin D3) 125 MCG (5000 UT) TABS    Cholecalciferol 25 MCG (1000 UT) capsule    co-enzyme Q-10 30 MG capsule    COLLAGEN PO    ELDERBERRY PO    estradiol (ESTRACE) 0 5 MG tablet    famotidine (PEPCID) 20 mg tablet    hyoscyamine (LEVSIN/SL) 0 125 mg SL tablet    Hyoscyamine Sulfate SL 0 125 MG SUBL    Lactobacillus (Probiotic Acidophilus) TABS    lidocaine (XYLOCAINE) 1 %    Magnesium 250 MG TABS    metoprolol tartrate (LOPRESSOR) 25 mg tablet    OREGANO PO    pyridoxine (VITAMIN B6) 100 mg tablet    Sodium Hyaluronate (Euflexxa) 20 MG/2ML SOSY    TURMERIC PO    metoprolol tartrate (LOPRESSOR) 50 mg tablet    No Known Allergies        Objective     There were no vitals taken for this visit  There is no height or weight on file to calculate BMI  PHYSICAL EXAM:      General Appearance:   Alert, cooperative, no distress   HEENT:   Normocephalic, atraumatic, anicteric      Neck:  Supple, symmetrical, trachea midline   Lungs:   Clear to auscultation bilaterally; no rales, rhonchi or wheezing; respirations unlabored    Heart[de-identified]   Regular rate and rhythm; no murmur, rub, or gallop  Abdomen:   Soft, non-tender, non-distended; normal bowel sounds; no masses, no organomegaly    Genitalia:   Deferred    Rectal:   Deferred    Extremities:  No cyanosis, clubbing or edema    Pulses:  2+ and symmetric    Skin:  No jaundice, rashes, or lesions    Lymph nodes:  No palpable cervical lymphadenopathy        Lab Results:   No visits with results within 1 Day(s) from this visit  Latest known visit with results is:   Office Visit on 07/25/2022   Component Date Value    SARS-CoV-2 07/25/2022 Positive (A)    INFLUENZA A PCR 07/25/2022 Negative     INFLUENZA B PCR 07/25/2022 Negative          Radiology Results:   No results found    Answers for HPI/ROS submitted by the patient on 8/9/2022  Chronicity: new  Onset: more than 1 month ago  Onset quality: undetermined  Frequency: intermittently  Progression since onset: unchanged  Pain location: right flank  Pain - numeric: 5/10  Pain quality: aching  Radiates to: pelvis  anorexia: No  arthralgias: Yes  belching: No  constipation: No  diarrhea: No  dysuria: No  fever: No  flatus: No  frequency: No  headaches: Yes  hematochezia: Yes  hematuria: No  melena: No  myalgias: Yes  nausea:  No  weight loss: No  vomiting: No  Aggravated by: nothing  Relieved by: being still  Diagnostic workup: CT scan

## 2022-08-12 ENCOUNTER — LAB (OUTPATIENT)
Dept: LAB | Facility: MEDICAL CENTER | Age: 77
End: 2022-08-12
Payer: MEDICARE

## 2022-08-12 ENCOUNTER — OFFICE VISIT (OUTPATIENT)
Dept: PHYSICAL THERAPY | Facility: MEDICAL CENTER | Age: 77
End: 2022-08-12
Payer: MEDICARE

## 2022-08-12 DIAGNOSIS — K62.5 RECTAL BLEEDING: ICD-10-CM

## 2022-08-12 DIAGNOSIS — M25.551 RIGHT HIP PAIN: Primary | ICD-10-CM

## 2022-08-12 DIAGNOSIS — E03.4 HYPOTHYROIDISM DUE TO ACQUIRED ATROPHY OF THYROID: ICD-10-CM

## 2022-08-12 DIAGNOSIS — E66.3 OVERWEIGHT: ICD-10-CM

## 2022-08-12 DIAGNOSIS — M25.552 LEFT HIP PAIN: ICD-10-CM

## 2022-08-12 LAB
ERYTHROCYTE [DISTWIDTH] IN BLOOD BY AUTOMATED COUNT: 13.2 % (ref 11.6–15.1)
HCT VFR BLD AUTO: 41.4 % (ref 34.8–46.1)
HGB BLD-MCNC: 13.3 G/DL (ref 11.5–15.4)
MCH RBC QN AUTO: 30.9 PG (ref 26.8–34.3)
MCHC RBC AUTO-ENTMCNC: 32.1 G/DL (ref 31.4–37.4)
MCV RBC AUTO: 96 FL (ref 82–98)
PLATELET # BLD AUTO: 192 THOUSANDS/UL (ref 149–390)
PMV BLD AUTO: 12.9 FL (ref 8.9–12.7)
RBC # BLD AUTO: 4.31 MILLION/UL (ref 3.81–5.12)
TSH SERPL DL<=0.05 MIU/L-ACNC: 2.1 UIU/ML (ref 0.45–4.5)
WBC # BLD AUTO: 8.23 THOUSAND/UL (ref 4.31–10.16)

## 2022-08-12 PROCEDURE — 97112 NEUROMUSCULAR REEDUCATION: CPT

## 2022-08-12 PROCEDURE — 97110 THERAPEUTIC EXERCISES: CPT

## 2022-08-12 PROCEDURE — 84443 ASSAY THYROID STIM HORMONE: CPT

## 2022-08-12 PROCEDURE — 36415 COLL VENOUS BLD VENIPUNCTURE: CPT

## 2022-08-12 PROCEDURE — 85027 COMPLETE CBC AUTOMATED: CPT

## 2022-08-12 NOTE — PROGRESS NOTES
Daily Note     Today's date: 2022  Patient name: Nuria Lamb  : 1945  MRN: [id number]  Referring provider: Mona Abbott DO  Dx:   Encounter Diagnosis     ICD-10-CM    1  Right hip pain  M25 551    2  Left hip pain  M25 552                   Subjective: Pt states feeling "great after last time " She does state she is very sore and in slight pain today after cleaning two homes and going to a music festival last night  Objective: See treatment diary below      Assessment: Tolerated treatment well  Patient limited by presenting symptoms during lateral walks this session  She notes tenderness over R greater trochanter this session  Patient demonstrated fatigue post treatment and would benefit from continued PT      Plan: Continue per plan of care  Progress treatment as tolerated         Precautions: GERD    Pt 1:1 [id number]  Manuals 2022   2022   7/15/2022   2022   2022   2022   2022   2022   2022   2022     PROM  BL Hip  Bl Hip BL Hip BL Hip  BL Hip BL Hip Bl HIP B/L Hip B/L Hip   MET  scissor           STM             Jt Mob  Long axis traction Long axis  traction Long axis traction Long axis traction hold       Neuro Re-Ed             Bridge HP 3x10 3x10 3x10 3x10 3x10 3x10 w/ GTB 3x10 w/ GTB 3x10 w/ GTB 3x10 w/ GTB   Clamshell  3x10 BL 3x10 GTB BL 3x10 GTB BL 5" 3x10 GTB BL 3x10 GTB BL 3x10 GTB 3x10 GTB 3x10 GTB 3x10 GTB   Hip Add Iso  5" 2x10--HEP 5" 2x10 5" 3x10 5" 3x10 5" 3x10 5" 3x10 5" 3x10 5" 3x10 5" 3x10   SLR    3x10 BL 3x10 BL 3x10 BL 3x10 BL 2# 3x10 BL 2 5# hold 3x10                                           Ther Ex             Supine HSS HEP 30" 3x BL 30" 3x BL 30" 3x BL 30" 3x BL 30" 3x BL 30" 3x BL 30" 3x BL 30   3x BL 30" 3x BL   BIke   5' 5' 5' 5' 5' 5'  5; 5'   LAQ  5# 3x10 BL 5# 3x10 BL 7# 3x10 7# 3x10 BL 7# 3x10 BL 7# 3x10 BL 10# 3x10 BL 10# 3x10 BL 10# 3x10 BL   Adductor Stretch HEP 30" 3x BL 30" 3x BL 30" 3x BL 30" 3x BL 30" 3x BL 30" 3x BL 30" 3x BL 30" 3x BL 30" 3x BL   It Band Stretch   30" 3x BL NP         LP     85# 3x10 Held       Lat Walk      7# 10x laps at table 10x laps at table GTB 10x laps at table GTB 10x laps at table 7# 5x pain*   CC walk out        10x laps 50#  10x laps 50# NV   Ther Activity             Reevaluation         MMT and ROM 5'                 Gait Training                                       Modalities

## 2022-08-15 ENCOUNTER — OFFICE VISIT (OUTPATIENT)
Dept: PHYSICAL THERAPY | Facility: MEDICAL CENTER | Age: 77
End: 2022-08-15
Payer: MEDICARE

## 2022-08-15 DIAGNOSIS — M25.552 LEFT HIP PAIN: ICD-10-CM

## 2022-08-15 DIAGNOSIS — M25.551 RIGHT HIP PAIN: Primary | ICD-10-CM

## 2022-08-15 PROCEDURE — 97112 NEUROMUSCULAR REEDUCATION: CPT

## 2022-08-15 PROCEDURE — 97110 THERAPEUTIC EXERCISES: CPT

## 2022-08-15 NOTE — PROGRESS NOTES
Daily Note     Today's date: 8/15/2022  Patient name: Flora Sarabia  : 1945  MRN: [id number]  Referring provider: Louise Ramos DO  Dx:   Encounter Diagnosis     ICD-10-CM    1  Right hip pain  M25 551    2  Left hip pain  M25 552        Start Time: 0804  Stop Time: 0845  Total time in clinic (min): 41 minutes    Subjective: Pt states a slight increase in R hip pain after a very active weekend including dancing and yard work  Objective: See treatment diary below      Assessment: Tolerated treatment well  Patient limited in performance of exercise this session due to R hip pain  Does state an improvement in symptoms at end of session, however unable to tolerate increased progression of resistance today  Patient demonstrated fatigue post treatment and would benefit from continued PT      Plan: Continue per plan of care  Progress treatment as tolerated         Precautions: GERD    Pt 1:1 [id number]  Manuals 8/15/2022                PROM R Hip            MET             STM             Jt Mob             Neuro Re-Ed             Bridge 3x10 w/ GTB            Clamshell 3x10 GTB            Hip Add Iso 5" 3x10            SLR 3x10 L, R painful            Ther Ex             Supine HSS HEP            BIke 5'            LAQ 10# 3x10 BL            Adductor Stretch HEP            Lat Walk NV            Sled Push/Pull 50# 15' 5x            CC walk out 10x laps 50#            Ther Activity             Reevaluation                          Gait Training                                       Modalities

## 2022-08-19 ENCOUNTER — OFFICE VISIT (OUTPATIENT)
Dept: PHYSICAL THERAPY | Facility: MEDICAL CENTER | Age: 77
End: 2022-08-19
Payer: MEDICARE

## 2022-08-19 DIAGNOSIS — M25.552 LEFT HIP PAIN: ICD-10-CM

## 2022-08-19 DIAGNOSIS — M25.551 RIGHT HIP PAIN: Primary | ICD-10-CM

## 2022-08-19 PROCEDURE — 97112 NEUROMUSCULAR REEDUCATION: CPT

## 2022-08-19 PROCEDURE — 97110 THERAPEUTIC EXERCISES: CPT

## 2022-08-19 PROCEDURE — 97140 MANUAL THERAPY 1/> REGIONS: CPT

## 2022-08-19 NOTE — PROGRESS NOTES
Daily Note     Today's date: 2022  Patient name: Raad Saleh  : 1945  MRN: [id number]  Referring provider: Khoa Sykes DO  Dx:   Encounter Diagnosis     ICD-10-CM    1  Right hip pain  M25 551    2  Left hip pain  M25 552        Start Time: 0810  Stop Time: 0848  Total time in clinic (min): 38 minutes    Subjective: Pt states she returned to previous exercise classes with minimal discomfort  She states she had slight pain when performing some of the "warm up" exercises  But states no issues with class  She states she performed a line dancing class after which increased her symptoms  Objective: See treatment diary below      Assessment: Tolerated treatment well  Patient performs step up exercise with no complaints of pain  Patient demonstrated fatigue post treatment and would benefit from continued PT      Plan: Continue per plan of care  Progress treatment as tolerated         Precautions: GERD    Pt 1:1   Manuals 8/15/2022     2022             PROM R Hip R hip           MET             STM             Jt Mob             Neuro Re-Ed             Bridge 3x10 w/ GTB 3x10 w/ BTB           Clamshell 3x10 GTB 3x10 BTB           Hip Add Iso 5" 3x10 5" 3x10           SLR 3x10 L, R painful NP           Ther Ex             Supine HSS HEP            BIke 5' 5'           LAQ 10# 3x10 BL 10# 3x10 BL           Adductor Stretch HEP            Lat Walk NV NP           Standing Hip Abd  2x10 BL 7#           Sled Push/Pull 50# 15' 5x 50# 15' 5x           Step Up  6" 2x10 BL w/ UE support           Lat Step Up  6" 2x10 BL w/ UE support           CC walk out 10x laps 50#            Ther Activity             Reevaluation                          Gait Training                                       Modalities

## 2022-08-22 ENCOUNTER — OFFICE VISIT (OUTPATIENT)
Dept: PHYSICAL THERAPY | Facility: MEDICAL CENTER | Age: 77
End: 2022-08-22
Payer: MEDICARE

## 2022-08-22 DIAGNOSIS — M25.551 RIGHT HIP PAIN: Primary | ICD-10-CM

## 2022-08-22 DIAGNOSIS — M25.552 LEFT HIP PAIN: ICD-10-CM

## 2022-08-22 PROCEDURE — 97110 THERAPEUTIC EXERCISES: CPT

## 2022-08-22 PROCEDURE — 97112 NEUROMUSCULAR REEDUCATION: CPT

## 2022-08-22 NOTE — PROGRESS NOTES
Daily Note     Today's date: 2022  Patient name: Everette Arriola  : 1945  MRN: [id number]  Referring provider: Timothy Gregorio DO  Dx: No diagnosis found  Subjective: Pt sates she felt an improvement in symptoms after previous session  Objective: See treatment diary below      Assessment: Tolerated treatment well  Patient not limited this session by pain  Tolerates increased resistance well  Pt was educated to continue increasing activity to tolerance  Patient demonstrated fatigue post treatment and would benefit from continued PT      Plan: Continue per plan of care  Potential discharge next visit       Precautions: GERD    Pt 1:1   Manuals 8/15/2022     2022   2022            PROM R Hip R hip R hip          MET             STM             Jt Mob             Neuro Re-Ed             Bridge 3x10 w/ GTB 3x10 w/ BTB 3x10 w/ BTB          Clamshell 3x10 GTB 3x10 BTB 3x10 BTB          Hip Add Iso 5" 3x10 5" 3x10 5" 3x10          SLR 3x10 L, R painful NP           Ther Ex             Supine HSS HEP            BIke 5' 5' 5'          LAQ 10# 3x10 BL 10# 3x10 BL 10# 3x10          Adductor Stretch HEP            Lat Walk NV NP           Standing Hip Abd  2x10 BL 7# 2x10 Bl 10#          Sled Push/Pull 50# 15' 5x 50# 15' 5x 75# 15' 5x          Step Up  6" 2x10 BL w/ UE support 6" 3x10          Lat Step Up  6" 2x10 BL w/ UE support 6" 3x10          CC walk out 10x laps 50#            Ther Activity             Reevaluation                          Gait Training                                       Modalities

## 2022-08-26 ENCOUNTER — OFFICE VISIT (OUTPATIENT)
Dept: PHYSICAL THERAPY | Facility: MEDICAL CENTER | Age: 77
End: 2022-08-26
Payer: MEDICARE

## 2022-08-26 DIAGNOSIS — M25.552 LEFT HIP PAIN: ICD-10-CM

## 2022-08-26 DIAGNOSIS — M25.551 RIGHT HIP PAIN: Primary | ICD-10-CM

## 2022-08-26 PROCEDURE — 97112 NEUROMUSCULAR REEDUCATION: CPT

## 2022-08-26 PROCEDURE — 97110 THERAPEUTIC EXERCISES: CPT

## 2022-08-26 NOTE — PROGRESS NOTES
Daily Note     Today's date: 2022  Patient name: Lupe Resendez  : 1945  MRN: [id number]  Referring provider: Metro Kussmaul, DO  Dx:   Encounter Diagnosis     ICD-10-CM    1  Right hip pain  M25 551    2  Left hip pain  M25 552                   Subjective: Pt states she attended exercise classes this week  She states she was not limited in performance and states she has had less pain since starting skilled services  Objective: See treatment diary below      Assessment: Tolerated treatment well  Pt was educated on potential discharge to [facility] magement of symptoms within next one to two visits  Pt in agreement and states understanding  Patient demonstrated fatigue post treatment and would benefit from continued PT      Plan: Continue per plan of care  Progress treatment as tolerated         Precautions: GERD    Pt 1:1   Manuals 8/15/2022     2022   2022   2022           PROM R Hip R hip R hip R hip         MET             STM             Jt Mob             Neuro Re-Ed             Bridge 3x10 w/ GTB 3x10 w/ BTB 3x10 w/ BTB 3x10 w/BTB         Clamshell 3x10 GTB 3x10 BTB 3x10 BTB 3x10 BTB         Hip Add Iso 5" 3x10 5" 3x10 5" 3x10 5" 3x10         SLR 3x10 L, R painful NP           Ther Ex             Supine HSS HEP            BIke 5' 5' 5' 5'         RF stretch    30" 3x         LAQ 10# 3x10 BL 10# 3x10 BL 10# 3x10 10# 3x10 BL         Adductor Stretch HEP            Lat Walk NV NP           Standing Hip Abd  2x10 BL 7# 2x10 Bl 10# 2x10 BL 10#         Sled Push/Pull 50# 15' 5x 50# 15' 5x 75# 15' 5x 75# 15' 5x         Step Up  6" 2x10 BL w/ UE support 6" 3x10 6" 3x10         Lat Step Up  6" 2x10 BL w/ UE support 6" 3x10 6" 3x10         CC walk out 10x laps 50#            Ther Activity             Reevaluation                          Gait Training                                       Modalities

## 2022-09-19 ENCOUNTER — EVALUATION (OUTPATIENT)
Dept: PHYSICAL THERAPY | Facility: MEDICAL CENTER | Age: 77
End: 2022-09-19
Payer: MEDICARE

## 2022-09-19 DIAGNOSIS — M25.552 LEFT HIP PAIN: ICD-10-CM

## 2022-09-19 DIAGNOSIS — M25.551 RIGHT HIP PAIN: Primary | ICD-10-CM

## 2022-09-19 PROCEDURE — 97110 THERAPEUTIC EXERCISES: CPT

## 2022-09-19 PROCEDURE — 97140 MANUAL THERAPY 1/> REGIONS: CPT

## 2022-09-19 PROCEDURE — 97164 PT RE-EVAL EST PLAN CARE: CPT

## 2022-09-19 NOTE — PROGRESS NOTES
PT Re-Evaluation     Today's date: 2022  Patient name: Randall Delgado  : 1945  MRN: [id number]  Referring provider: Janine Reed DO  Dx:   Encounter Diagnosis     ICD-10-CM    1  Right hip pain  M25 551    2  Left hip pain  M25 552        Start Time: 1022  Stop Time: 1110  Total time in clinic (min): 48 minutes    Assessment  Assessment details: Rosio Ashford is a 76y/o female who presents to OP PT with a a referral from Dr José Miguel Diaz with a medical diagnosis of pain in left hip, pain in right hip  Pt has attended 15 sessions of skilled PT services to date  Pt returns after vacation and MD ló[place] where patient received two CSI  She has been progressing toward pt and PT stated goals  Upon reexamination patient presents with improvements in LE strength, and ROM  Pt does continue to demonstrate a decreased LE strength as well as soft tissue restrictions in R hip musculature  These deficits continue to impair patient's ability to perform ADLs, recreational activities and house hold duties at [facility]  Patient would continue to benefit from skilled PT services to address functional deficits and return to improved level of function  Impairments: abnormal gait, abnormal muscle firing, abnormal or restricted ROM, abnormal movement, activity intolerance, impaired physical strength, lacks appropriate home exercise program, pain with function and poor body mechanics    Symptom irritability: moderateUnderstanding of Dx/Px/POC: good   Prognosis: good    Goals  STG  2-6 weeks    1  Patient will demonstrate increased hip abduction strength to 4+/5 to improve gait mechanics in midstance and pre-swing stages  Progressing    2  Patient will be able to perform deep squat and return to standing without pain  MET    3  Pt will improve ROM by 25% MET     LTG  6 weeks to discharge    1  Patient will be independent with HEP for continued progress  Progressing    2       Patient will demonstrate 5/5 in all hip  MMT to improve tolerance for performing work and recreational activities and allow for return to prolonged community ambulation Progressing    3  Patient will attain FOTO score of anticipated  or greater at time of discharge  Progressing    4  Patient will return to PLOF in all recreational activities, ADLs and work duties  Progressing          Plan  Patient would benefit from: PT eval and skilled physical therapy  Referral necessary: No  Planned modality interventions: cryotherapy, TENS, thermotherapy: hydrocollator packs, high voltage pulsed current: pain management and high voltage pulsed current: spasm management  Planned therapy interventions: joint mobilization, manual therapy, massage, ADL training, balance, neuromuscular re-education, patient education, strengthening, stretching, therapeutic activities, therapeutic exercise, flexibility, functional ROM exercises, gait training, abdominal trunk stabilization, activity modification, ADL retraining, Montiel taping, aquatic therapy, balance/weight bearing training, behavior modification, orthotic fitting/training, orthotic management and training, motor coordination training, muscle pump exercises, body mechanics training, postural training, graded activity, graded exercise, dry needling, graded motor and home exercise program  Other planned therapy interventions: IASTM, (-) pressure STM  Frequency: 2x week  Plan of Care beginning date: 7/8/2022  Plan of Care expiration date: 10/28/2022  Treatment plan discussed with: patient        Subjective Evaluation    History of Present Illness  Mechanism of injury: Subjective: 09/19/22: Pt returns to skilled PT post vacation and f/u with MD  Pt states she received to [place] with no improvement in symptoms  She states she has been compliant with previous HEP  She states continued pain along the lateral aspect of her right hip and groin pain associated with bending over   She states she was unable to attend exercise classes due to being on vacation  She states with longer walks she did have an increase in her Right hip pain  Patient states she feels sore today after having to stop a moving car that was rolling away from her  She states she also had to drive 2+ hours this weekend which made her sore  She states she has been compliant with HEP  She states she has noticed an overall improvement in symptoms since starting skilled PT services  She states she is trying to increase frequency of silver sneaker classes  She states she does experience continued pain with long walks, and more intense exercise, driving and moving from sit to stand position  Patient states a history of bilateral hip pain, which has increased over the past year  She also notes bilateral low back pain  She states recently she has developed right groin pain as well  Patient states she tries to remain active, attending silver sneakers and the [employer]  Patient has multiple trips planned over the next two months  Pain  Type: sharp, stabbing  Current:  0/10  Best: 0/10  Worst: 5/10  Alleviates: ice, tylenol  Aggravates: sit to stand, getting in and out of car, sleeping on back    Occupation: retired    Imaging: IMPRESSION:     No acute osseous abnormality      Mild bilateral hip degenerative changes more prominent on the right  PMH:  GERD, BMI    Previous Surgeries: see file    Previous Physical Activity: silver sneakers, [employer] daily    Current Medications: see file    MATY: insidious    Surgery Date: n/a     MD: Dr Abida Miner    Patient Goals: increase activity without pain               Objective     Concurrent Complaints  Negative for night pain, disturbed sleep, bladder dysfunction, bowel dysfunction and saddle (S4) numbness    Palpation   Left   Tenderness of the gluteus medius  Right   Tenderness of the gluteus hermelinda, gluteus medius, piriformis and TFL  Trigger point to gluteus medius and piriformis       Tenderness     Left Hip   Tenderness in the PSIS  Right Hip   Tenderness in the PSIS and greater trochanter  Right Knee   Tenderness in the ITB  Active Range of Motion     Lumbar   Flexion:  with pain Restriction level: moderate  Extension:  Restriction level: moderate  Left lateral flexion:  Restriction level: moderate  Right lateral flexion:  Restriction level: moderate  Left rotation:  Restriction level: moderate  Right rotation:  Restriction level: moderate  Left Hip   Flexion: 90 degrees   Abduction: 40 degrees     Right Hip   Flexion: 84 degrees   Abduction: 40 degrees     Passive Range of Motion   Left Hip   Flexion: 90 degrees   External rotation (90/90): 50 degrees   Internal rotation (90/90): 35 degrees     Right Hip   Flexion: 90 degrees   External rotation (90/90): 35 degrees with pain  Internal rotation (90/90): 25 degrees     Additional Passive Range of Motion Details  Groin pain noted with ER this session    Joint Play     Right Hip   Joints within functional limits are the lateral hip capsule and long axis distraction  Pain: L4, L5 and S1     Strength/Myotome Testing     Left Hip   Planes of Motion   Flexion: 4+  Extension: 4+  Abduction: 4+  External rotation: 4-  Internal rotation: 4-    Right Hip   Planes of Motion   Flexion: 4-  Extension: 4  Abduction: 4  External rotation: 4-  Internal rotation: 4-    Left Knee   Flexion: 4+  Extension: 4+    Right Knee   Flexion: 4+  Extension: 4+    Tests     Lumbar     Left   Negative crossed SLR and passive SLR  Right   Negative crossed SLR and passive SLR  Right Hip   Negative long sit                Precautions: GERD    Manuals 9/19/2022          PROM         MET         STM TFL, IT Band         Jt Mob         Neuro Re-Ed         Bridge 3x10         Clamshell 3x10 AROM        SLR 2x10 BL        Ther Ex         BIke 5'        RF stretch 30" 3x BL        Adductor Stretch 30" 3x        Figure 4 stretch 30" 3x        Standing Hip Abd 2x10 BL Ther Activity         Reevaluation                  Gait Training                           Modalities

## 2022-09-19 NOTE — LETTER
2022    Tonya Cortes DO  [facility]    Patient: Kirke Aase   YOB: 1945   Date of Visit: 2022     Encounter Diagnosis     ICD-10-CM    1  Right hip pain  M25 551    2  Left hip pain  M25 552        Dear Dr Andy Siemens:    Thank you for your recent referral of Kirke Aase  Please review the attached evaluation summary from Yumiko's recent visit  Please verify that you agree with the plan of care by signing the attached order  If you have any questions or concerns, please do not hesitate to call  I sincerely appreciate the opportunity to share in the care of one of your patients and hope to have another opportunity to work with you in the near future  Sincerely,    Elmer Awad, PT      Referring Provider:      I certify that I have read the below Plan of Care and certify the need for these services furnished under this plan of treatment while under my care  Tonya Cortes DO  [address] [facility] [address]  Via Fax: [phone]          PT Re-Evaluation     Today's date: 2022  Patient name: Kirke Aase  : 1945  MRN: [id number]  Referring provider: Jane Ribera DO  Dx:   Encounter Diagnosis     ICD-10-CM    1  Right hip pain  M25 551    2  Left hip pain  M25 552        Start Time: 1022  Stop Time: 1110  Total time in clinic (min): 48 minutes    Assessment  Assessment details: Scott Samuel is a 76y/o female who presents to OP PT with a a referral from Dr Andy Siemens with a medical diagnosis of pain in left hip, pain in right hip  Pt has attended 15 sessions of skilled PT services to date  Pt returns after vacation and MD f/u where patient received two CSI  She has been progressing toward pt and PT stated goals  Upon reexamination patient presents with improvements in LE strength, and ROM   Pt does continue to demonstrate a decreased LE strength as well as soft tissue restrictions in R hip musculature  These deficits continue to impair patient's ability to perform ADLs, recreational activities and house hold duties at [address] [place] [address] [place]  Patient would continue to benefit from skilled PT services to address functional deficits and return to improved level of function  Impairments: abnormal gait, abnormal muscle firing, abnormal or restricted ROM, abnormal movement, activity intolerance, impaired physical strength, lacks appropriate home exercise program, pain with function and poor body mechanics    Symptom irritability: moderateUnderstanding of Dx/Px/POC: good   Prognosis: good    Goals  STG  2-6 weeks    1  Patient will demonstrate increased hip abduction strength to 4+/5 to improve gait mechanics in midstance and pre-swing stages  Progressing    2  Patient will be able to perform deep squat and return to standing without pain  MET    3  Pt will improve ROM by 25% MET     LTG  6 weeks to discharge    1  Patient will be independent with HEP for continued progress  Progressing    2  Patient will demonstrate 5/5 in all hip  MMT to improve tolerance for performing work and recreational activities and allow for return to prolonged community ambulation Progressing    3  Patient will attain FOTO score of anticipated  or greater at time of discharge  Progressing    4  Patient will return to PLOF in all recreational activities, ADLs and work duties   Progressing          Plan  Patient would benefit from: PT eval and skilled physical therapy  Referral necessary: No  Planned modality interventions: cryotherapy, TENS, thermotherapy: hydrocollator packs, high voltage pulsed current: pain management and high voltage pulsed current: spasm management  Planned therapy interventions: joint mobilization, manual therapy, massage, ADL training, balance, neuromuscular re-education, patient education, strengthening, stretching, therapeutic activities, therapeutic exercise, flexibility, functional ROM exercises, gait training, abdominal trunk stabilization, activity modification, ADL retraining, Montiel taping, aquatic therapy, balance/weight bearing training, behavior modification, orthotic fitting/training, orthotic management and training, motor coordination training, muscle pump exercises, body mechanics training, postural training, graded activity, graded exercise, dry needling, graded motor and home exercise program  Other planned therapy interventions: IASTM, (-) pressure STM  Frequency: 2x week  Plan of Care beginning date: 7/8/2022  Plan of Care expiration date: 10/28/2022  Treatment plan discussed with: patient        Subjective Evaluation    History of Present Illness  Mechanism of injury: Subjective: 09/19/22: Pt returns to skilled PT post vacation and f/u with MD  Pt states she received to [facility] with no improvement in symptoms  She states she has been compliant with previous HEP  She states continued pain along the lateral aspect of her right hip and groin pain associated with bending over  She states she was unable to attend exercise classes due to being on vacation  She states with longer walks she did have an increase in her Right hip pain  Patient states she feels sore today after having to stop a moving car that was rolling away from her  She states she also had to drive 2+ hours this weekend which made her sore  She states she has been compliant with HEP  She states she has noticed an overall improvement in symptoms since starting skilled PT services  She states she is trying to increase frequency of silver sneaker classes  She states she does experience continued pain with long walks, and more intense exercise, driving and moving from sit to stand position  Patient states a history of bilateral hip pain, which has increased over the past year  She also notes bilateral low back pain  She states recently she has developed right groin pain as well   Patient states she tries to remain active, attending silver sneakers and the [employer]  Patient has multiple trips planned over the next two months  Pain  Type: sharp, stabbing  Current:  0/10  Best: 0/10  Worst: 5/10  Alleviates: ice, tylenol  Aggravates: sit to stand, getting in and out of car, sleeping on back    Occupation: retired    Imaging: IMPRESSION:     No acute osseous abnormality      Mild bilateral hip degenerative changes more prominent on the right  PMH:  GERD, BMI    Previous Surgeries: see file    Previous Physical Activity: silver sneakers, [employer] daily    Current Medications: see file    MATY: insidious    Surgery Date: n/a     MD: Dr Rojelio Tijerina    Patient Goals: increase activity without pain               Objective     Concurrent Complaints  Negative for night pain, disturbed sleep, bladder dysfunction, bowel dysfunction and saddle (S4) numbness    Palpation   Left   Tenderness of the gluteus medius  Right   Tenderness of the gluteus hermelinda, gluteus medius, piriformis and TFL  Trigger point to gluteus medius and piriformis  Tenderness     Left Hip   Tenderness in the PSIS  Right Hip   Tenderness in the PSIS and greater trochanter  Right Knee   Tenderness in the ITB       Active Range of Motion     Lumbar   Flexion:  with pain Restriction level: moderate  Extension:  Restriction level: moderate  Left lateral flexion:  Restriction level: moderate  Right lateral flexion:  Restriction level: moderate  Left rotation:  Restriction level: moderate  Right rotation:  Restriction level: moderate  Left Hip   Flexion: 90 degrees   Abduction: 40 degrees     Right Hip   Flexion: 84 degrees   Abduction: 40 degrees     Passive Range of Motion   Left Hip   Flexion: 90 degrees   External rotation (90/90): 50 degrees   Internal rotation (90/90): 35 degrees     Right Hip   Flexion: 90 degrees   External rotation (90/90): 35 degrees with pain  Internal rotation (90/90): 25 degrees     Additional Passive Range of Motion Details  Groin pain noted with ER this session    Joint Play     Right Hip   Joints within functional limits are the lateral hip capsule and long axis distraction  Pain: L4, L5 and S1     Strength/Myotome Testing     Left Hip   Planes of Motion   Flexion: 4+  Extension: 4+  Abduction: 4+  External rotation: 4-  Internal rotation: 4-    Right Hip   Planes of Motion   Flexion: 4-  Extension: 4  Abduction: 4  External rotation: 4-  Internal rotation: 4-    Left Knee   Flexion: 4+  Extension: 4+    Right Knee   Flexion: 4+  Extension: 4+    Tests     Lumbar     Left   Negative crossed SLR and passive SLR  Right   Negative crossed SLR and passive SLR  Right Hip   Negative long sit                Precautions: GERD    Manuals 9/19/2022          PROM         MET         STM TFL, IT Band         Jt Mob         Neuro Re-Ed         Bridge 3x10         Clamshell 3x10 AROM        SLR 2x10 BL        Ther Ex         BIke 5'        RF stretch 30" 3x BL        Adductor Stretch 30" 3x        Figure 4 stretch 30" 3x        Standing Hip Abd 2x10 BL                                             Ther Activity         Reevaluation                  Gait Training                           Modalities

## 2022-09-23 ENCOUNTER — OFFICE VISIT (OUTPATIENT)
Dept: PHYSICAL THERAPY | Facility: MEDICAL CENTER | Age: 77
End: 2022-09-23
Payer: MEDICARE

## 2022-09-23 DIAGNOSIS — M25.551 RIGHT HIP PAIN: Primary | ICD-10-CM

## 2022-09-23 DIAGNOSIS — M25.552 LEFT HIP PAIN: ICD-10-CM

## 2022-09-23 PROCEDURE — 97140 MANUAL THERAPY 1/> REGIONS: CPT

## 2022-09-23 PROCEDURE — 97110 THERAPEUTIC EXERCISES: CPT

## 2022-09-23 PROCEDURE — 97112 NEUROMUSCULAR REEDUCATION: CPT

## 2022-09-23 NOTE — PROGRESS NOTES
Daily Note     Today's date: 2022  Patient name: Sheila Crouch  : 1945  MRN: [id number]  Referring provider: Sheryl Monreal DO  Dx:   Encounter Diagnosis     ICD-10-CM    1  Right hip pain  M25 551    2  Left hip pain  M25 552                   Subjective: Pt states significant improvement in right hip symptoms after previous visit  She states she was able to mow her lawn, and line dance with less pain  Objective: See treatment diary below      Assessment: Tolerated treatment well  Emphasis placed on continued strengthening this session  Pt denies issue with progression of exercise  Patient demonstrated fatigue post treatment and would benefit from continued PT      Plan: Continue per plan of care  Progress treatment as tolerated         Precautions: GERD    Manuals 2022         PROM         MET         STM TFL, IT Band  TFL, IT Band       Jt Mob         Neuro Re-Ed         Bridge 3x10  3x10       Clamshell 3x10 AROM 3x10 RTB       SL Hip Abd  2x10        SLR 2x10 BL 2x10 BL       Ther Ex         BIke 5' 5'       RF stretch 30" 3x BL 30" 3x BL       Adductor Stretch 30" 3x 30" 3x       Figure 4 stretch 30" 3x 30" 3x       Standing Hip Abd 2x10 BL  2x10 BL RTB       Lat Band Walk  RTB 10x laps       Lat Step Up  6" 2x10       Step Up  2x10                Ther Activity         Reevaluation                  Gait Training                           Modalities

## 2022-09-26 ENCOUNTER — OFFICE VISIT (OUTPATIENT)
Dept: PHYSICAL THERAPY | Facility: MEDICAL CENTER | Age: 77
End: 2022-09-26
Payer: MEDICARE

## 2022-09-26 DIAGNOSIS — M25.551 RIGHT HIP PAIN: Primary | ICD-10-CM

## 2022-09-26 DIAGNOSIS — M25.552 LEFT HIP PAIN: ICD-10-CM

## 2022-09-26 PROCEDURE — 97110 THERAPEUTIC EXERCISES: CPT

## 2022-09-26 PROCEDURE — 97140 MANUAL THERAPY 1/> REGIONS: CPT

## 2022-09-26 NOTE — PROGRESS NOTES
Daily Note     Today's date: 2022  Patient name: Sherrill Hinton  : 1945  MRN: [id number]  Referring provider: Keren Duncan DO  Dx:   Encounter Diagnosis     ICD-10-CM    1  Right hip pain  M25 551    2  Left hip pain  M25 552        Start Time: 1200  Stop Time: 1241  Total time in clinic (min): 41 minutes    Subjective: Pt an increase in soreness after previous session  She states she was walking a lot outside on uneven ground and wonders if this is why she is sore  Objective: See treatment diary below      Assessment: Tolerated treatment well  Progression of resistance held this session secondary to patient presentation  Pt was educated to increase frequency of stretching as part of HEP to address restrictions in lateral hip  Patient demonstrated fatigue post treatment and would benefit from continued PT      Plan: Continue per plan of care  Progress treatment as tolerated         Precautions: GERD    Manuals 2022        PROM         MET         STM TFL, IT Band  TFL, IT Band TFL, IT band      Jt Mob         Neuro Re-Ed         Bridge 3x10  3x10 3x10      Clamshell 3x10 AROM 3x10 RTB 3x10 RTB      SL Hip Abd  2x10  2x10      SLR 2x10 BL 2x10 BL 2x10 BL      Ther Ex         BIke 5' 5' 5'      RF stretch 30" 3x BL 30" 3x BL 30" 3x BL      Adductor Stretch 30" 3x 30" 3x 30" 3x      Figure 4 stretch 30" 3x 30" 3x 30" 3x      Standing Hip Abd 2x10 BL  2x10 BL RTB 2x10 BL RBT      Lat Band Walk  RTB 10x laps RTB 10x laps      Lat Step Up  6" 2x10 6" 2x10      Step Up  2x10 2x10               Ther Activity         Reevaluation                  Gait Training                           Modalities

## 2022-09-30 ENCOUNTER — OFFICE VISIT (OUTPATIENT)
Dept: PHYSICAL THERAPY | Facility: MEDICAL CENTER | Age: 77
End: 2022-09-30
Payer: MEDICARE

## 2022-09-30 DIAGNOSIS — M25.551 RIGHT HIP PAIN: Primary | ICD-10-CM

## 2022-09-30 DIAGNOSIS — M25.552 LEFT HIP PAIN: ICD-10-CM

## 2022-09-30 PROCEDURE — 97140 MANUAL THERAPY 1/> REGIONS: CPT

## 2022-09-30 PROCEDURE — 97110 THERAPEUTIC EXERCISES: CPT

## 2022-09-30 NOTE — PROGRESS NOTES
Daily Note     Today's date: 2022  Patient name: Jason Sierra  : 1945  MRN: [id number]  Referring provider: Braydon Cruz DO  Dx:   Encounter Diagnosis     ICD-10-CM    1  Right hip pain  M25 551    2  Left hip pain  M25 552        Start Time: 0816  Stop Time: 5645  Total time in clinic (min): 41 minutes    Subjective: Pt notes an increase in groin pain today  She does state she was able to mow her lawn this week as well as take exercise class with less pain than before  Objective: See treatment diary below      Assessment: Tolerated treatment well  Addressed adductor strengthening this session per patient report of pain  She does note a small improvement in symptoms at end of session  Patient demonstrated fatigue post treatment and would benefit from continued PT      Plan: Continue per plan of care  Progress treatment as tolerated         Precautions: GERD    Manuals 2022       PROM         MET         STM TFL, IT Band  TFL, IT Band TFL, IT band TFL, IT band     Jt Mob         Neuro Re-Ed         Bridge 3x10  3x10 3x10 3x10     Clamshell 3x10 AROM 3x10 RTB 3x10 RTB 3x10 GTB     SL Hip Abd  2x10  2x10 3x10     SL Hip Add    3x10     HL Hip Add Iso    5" 3x10     SLR 2x10 BL 2x10 BL 2x10 BL 3x10 BL     Ther Ex         BIke 5' 5' 5' 5'     RF stretch 30" 3x BL 30" 3x BL 30" 3x BL 30" 3x BL     Adductor Stretch 30" 3x 30" 3x 30" 3x 30" 3x     Figure 4 stretch 30" 3x 30" 3x 30" 3x 30" 3x     Standing Hip Abd 2x10 BL  2x10 BL RTB 2x10 BL RBT NV     Lat Band Walk  RTB 10x laps RTB 10x laps NV     Lat Step Up  6" 2x10 6" 2x10 NV     Step Up  2x10 2x10 NV              Ther Activity         Reevaluation                  Gait Training                           Modalities

## 2022-10-03 ENCOUNTER — OFFICE VISIT (OUTPATIENT)
Dept: PHYSICAL THERAPY | Facility: MEDICAL CENTER | Age: 77
End: 2022-10-03
Payer: MEDICARE

## 2022-10-03 DIAGNOSIS — M25.552 LEFT HIP PAIN: ICD-10-CM

## 2022-10-03 DIAGNOSIS — M25.551 RIGHT HIP PAIN: Primary | ICD-10-CM

## 2022-10-03 PROCEDURE — 97110 THERAPEUTIC EXERCISES: CPT

## 2022-10-03 NOTE — PROGRESS NOTES
Daily Note     Today's date: 10/3/2022  Patient name: Selena Davis  : 1945  MRN: [id number]  Referring provider: Arnaud Dallas DO  Dx:   Encounter Diagnosis     ICD-10-CM    1  Right hip pain  M25 551    2  Left hip pain  M25 552        Start Time: 0804  Stop Time: 9023  Total time in clinic (min): 46 minutes    Subjective: Pt notes improvement in lateral aspect of her hip  She does continue to have adductor/groin pain  Objective: See treatment diary below      Assessment: Tolerated treatment well  Pt notes some improvement in symptoms, less discomfort in stretching post STM of adductors  Patient demonstrated fatigue post treatment and would benefit from continued PT      Plan: Continue per plan of care  Progress treatment as tolerated         Precautions: GERD  PT 1:1 [id number]  Manuals 2022   2022   2022   2022   10/3/2022      PROM         MET         STM TFL, IT Band  TFL, IT Band TFL, IT band TFL, IT band TFL, IT band, adducotr    Jt Mob         Neuro Re-Ed         Bridge 3x10  3x10 3x10 3x10 3x10    Clamshell 3x10 AROM 3x10 RTB 3x10 RTB 3x10 GTB 3x10 GTB    SL Hip Abd  2x10  2x10 3x10 3x130    SL Hip Add    3x10 3x10    HL Hip Add Iso    5" 3x10 5" 3x10    SLR 2x10 BL 2x10 BL 2x10 BL 3x10 BL 3x10 BL    Ther Ex         BIke 5' 5' 5' 5' 5'    RF stretch 30" 3x BL 30" 3x BL 30" 3x BL 30" 3x BL 30" 3x BL    Adductor Stretch 30" 3x 30" 3x 30" 3x 30" 3x 30" 3x    Figure 4 stretch 30" 3x 30" 3x 30" 3x 30" 3x 30" 3x    Standing Hip Abd 2x10 BL  2x10 BL RTB 2x10 BL RBT NV     Lat Band Walk  RTB 10x laps RTB 10x laps NV     Lat Step Up  6" 2x10 6" 2x10 NV     Step Up  2x10 2x10 NV              Ther Activity         Reevaluation                  Gait Training                           Modalities

## 2022-10-07 ENCOUNTER — OFFICE VISIT (OUTPATIENT)
Dept: PHYSICAL THERAPY | Facility: MEDICAL CENTER | Age: 77
End: 2022-10-07
Payer: MEDICARE

## 2022-10-07 DIAGNOSIS — M25.552 LEFT HIP PAIN: ICD-10-CM

## 2022-10-07 DIAGNOSIS — M25.551 RIGHT HIP PAIN: Primary | ICD-10-CM

## 2022-10-07 PROCEDURE — 97110 THERAPEUTIC EXERCISES: CPT

## 2022-10-07 PROCEDURE — 97112 NEUROMUSCULAR REEDUCATION: CPT

## 2022-10-07 PROCEDURE — 97140 MANUAL THERAPY 1/> REGIONS: CPT

## 2022-10-07 NOTE — PROGRESS NOTES
Daily Note     Today's date: 10/7/2022  Patient name: Jason Sierra  : 1945  MRN: [id number]  Referring provider: Braydon Cruz DO  Dx: No diagnosis found  Start Time: 820  Stop Time: 0900  Total time in clinic (min): 40 minutes    Subjective: Pt states conitnued deep Right hip pain  She does note improvement in lateral hip pain  Objective: See treatment diary below      Assessment: Tolerated treatment well  Anticipate discharge within next 1-2 visits if symptoms do not return  Pt was educated on importance of HEP compliance and increased physical activity to tolerance for long term success  Pt states understanding and agrees  Pt would continue to benefit from skilled PT services to address functional deficits  Plan: Continue per plan of care  Progress treatment as tolerated         Precautions: GERD  PT 1:1   Manuals 2022   2022   2022   2022   10/3/2022   10/7/2022     PROM         MET         STM TFL, IT Band  TFL, IT Band TFL, IT band TFL, IT band TFL, IT band, adducotr TFL, IT band, adductor   Jt Mob         Neuro Re-Ed         Bridge 3x10  3x10 3x10 3x10 3x10 3x10   Clamshell 3x10 AROM 3x10 RTB 3x10 RTB 3x10 GTB 3x10 GTB 3x10 GTB   SL Hip Abd  2x10  2x10 3x10 3x130 3x10   SL Hip Add    3x10 3x10 3x10   HL Hip Add Iso    5" 3x10 5" 3x10 5" 3x10   SLR 2x10 BL 2x10 BL 2x10 BL 3x10 BL 3x10 BL 3x10 BL   Ther Ex         BIke 5' 5' 5' 5' 5' 5'   RF stretch 30" 3x BL 30" 3x BL 30" 3x BL 30" 3x BL 30" 3x BL 30" 3x BL   Adductor Stretch 30" 3x 30" 3x 30" 3x 30" 3x 30" 3x 30" 3x   Figure 4 stretch 30" 3x 30" 3x 30" 3x 30" 3x 30" 3x 30" 3x   Standing Hip Abd 2x10 BL  2x10 BL RTB 2x10 BL RBT NV  2x10 BL RTB   Lat Band Walk  RTB 10x laps RTB 10x laps NV     Lat Step Up  6" 2x10 6" 2x10 NV     Step Up  2x10 2x10 NV              Ther Activity         Reevaluation                  Gait Training                           Modalities

## 2022-10-10 ENCOUNTER — OFFICE VISIT (OUTPATIENT)
Dept: PHYSICAL THERAPY | Facility: MEDICAL CENTER | Age: 77
End: 2022-10-10
Payer: MEDICARE

## 2022-10-10 DIAGNOSIS — M25.551 RIGHT HIP PAIN: Primary | ICD-10-CM

## 2022-10-10 DIAGNOSIS — M25.552 LEFT HIP PAIN: ICD-10-CM

## 2022-10-10 PROCEDURE — 97110 THERAPEUTIC EXERCISES: CPT

## 2022-10-10 PROCEDURE — 97140 MANUAL THERAPY 1/> REGIONS: CPT

## 2022-10-10 NOTE — PROGRESS NOTES
Daily Note     Today's date: 10/10/2022  Patient name: Kenia Macias  : 1945  MRN: [id number]  Referring provider: Zi Houston DO  Dx:   Encounter Diagnosis     ICD-10-CM    1  Right hip pain  M25 551    2  Left hip pain  M25 552                   Subjective: Pt states continued soreness  She does note she did a lot of walking this weekend  Objective: See treatment diary below      Assessment: Tolerated treatment well  Patient demonstrated fatigue post treatment and would benefit from continued PT      Plan: Continue per plan of care  Potential discharge next visit  Progress treatment as tolerated         Precautions: GERD  PT 1:1 [id number]  Manuals 10/10/2022     2022   2022   2022   10/3/2022   10/7/2022     PROM         MET         STM TFL, IT Band, adductor  TFL, IT Band TFL, IT band TFL, IT band TFL, IT band, adducotr TFL, IT band, adductor   Jt Mob         Neuro Re-Ed         Bridge 3x10  3x10 3x10 3x10 3x10 3x10   Clamshell 3x10 GTB 3x10 RTB 3x10 RTB 3x10 GTB 3x10 GTB 3x10 GTB   SL Hip Abd 3x10 2x10  2x10 3x10 3x130 3x10   SL Hip Add 3x10   3x10 3x10 3x10   HL Hip Add Iso 5" 3x10   5" 3x10 5" 3x10 5" 3x10   SLR 2x10 BL 2x10 BL 2x10 BL 3x10 BL 3x10 BL 3x10 BL   Ther Ex         BIke 5' 5' 5' 5' 5' 5'   RF stretch 30" 3x BL 30" 3x BL 30" 3x BL 30" 3x BL 30" 3x BL 30" 3x BL   Adductor Stretch 30" 3x 30" 3x 30" 3x 30" 3x 30" 3x 30" 3x   Figure 4 stretch 30" 3x 30" 3x 30" 3x 30" 3x 30" 3x 30" 3x   Standing Hip Abd 2x10 BL  2x10 BL RTB 2x10 BL RBT NV  2x10 BL RTB   Pt Ed 5' STM with tennis ball R hip                                   Ther Activity         Reevaluation                  Gait Training                           Modalities

## 2022-10-12 PROBLEM — Z12.11 COLON CANCER SCREENING: Status: RESOLVED | Noted: 2020-06-19 | Resolved: 2022-10-12

## 2022-10-13 ENCOUNTER — OFFICE VISIT (OUTPATIENT)
Dept: PHYSICAL THERAPY | Facility: MEDICAL CENTER | Age: 77
End: 2022-10-13
Payer: MEDICARE

## 2022-10-13 DIAGNOSIS — M25.552 LEFT HIP PAIN: ICD-10-CM

## 2022-10-13 DIAGNOSIS — M25.551 RIGHT HIP PAIN: Primary | ICD-10-CM

## 2022-10-13 PROCEDURE — 97112 NEUROMUSCULAR REEDUCATION: CPT

## 2022-10-13 PROCEDURE — 97110 THERAPEUTIC EXERCISES: CPT

## 2022-10-13 NOTE — PROGRESS NOTES
Daily Note     Today's date: 10/13/2022  Patient name: Selena Davis  : 1945  MRN: [id number]  Referring provider: Arnaud Dallas DO  Dx:   Encounter Diagnosis     ICD-10-CM    1  Right hip pain  M25 551    2  Left hip pain  M25 552                   Subjective: Pt states she cleaned her house and scrubbed her floors this week as well as attending 2 exercise classes  She notes moderate soreness  Objective: See treatment diary below      Assessment: Tolerated treatment well  Pt was educated to perform activities and cleaning Patient demonstrated fatigue post treatment and would benefit from continued PT      Plan: Continue per plan of care  Progress treatment as tolerated         Precautions: GERD  PT 1:1   Manuals 10/10/2022     10/13/2022           PROM         MET         STM TFL, IT Band, adductor  TFL, IT Band       Jt Mob         Neuro Re-Ed         Bridge 3x10  3x10       Clamshell 3x10 GTB 3x10 RTB       SL Hip Abd 3x10 2x10        SL Hip Add 3x10 3x10       HL Hip Add Iso 5" 3x10        SLR 2x10 BL 2x10 BL       Ther Ex         BIke 5' 5'       RF stretch 30" 3x BL 30" 3x BL       Adductor Stretch 30" 3x 30" 3x       Figure 4 stretch 30" 3x 30" 3x       Standing Hip Abd 2x10 BL  2x10 BL RTB       Pt Ed 5' STM with tennis ball R hip                                   Ther Activity         Reevaluation                  Gait Training                           Modalities

## 2022-11-01 DIAGNOSIS — Z78.0 MENOPAUSE: ICD-10-CM

## 2022-11-01 RX ORDER — ESTRADIOL 0.5 MG/1
TABLET ORAL
Qty: 90 TABLET | Refills: 0 | Status: SHIPPED | OUTPATIENT
Start: 2022-11-01

## 2022-11-14 ENCOUNTER — EVALUATION (OUTPATIENT)
Dept: PHYSICAL THERAPY | Facility: MEDICAL CENTER | Age: 77
End: 2022-11-14

## 2022-11-14 DIAGNOSIS — M25.551 RIGHT HIP PAIN: Primary | ICD-10-CM

## 2022-11-14 NOTE — PROGRESS NOTES
PT Evaluation     Today's date: 2022  Patient name: Sheila Crouch  : 1945  MRN: [id number]  Referring provider: Sheryl Monreal DO  Dx:   Encounter Diagnosis     ICD-10-CM    1  Right hip pain  M25 551        Start Time: 1400  Stop Time: 1445  Total time in clinic (min): 45 minutes    Assessment  Assessment details: Patient is a 78year old female presenting to outpatient PT with a referral by Dr Leslie Loja for a diagnosis of Right Hip Pain  Patients  Patient presented with limited ROM and strength secondary to pain  Valgus collapse and tibial translation over toes present during functional squat  Pain during active R LE abduction and flexion that improved with PROM affecting car transfer and bed mobility  Patient's deficits affect her ability to perform household activities, yard work, and ADLs without pain  Difficulty and pain demonstrated with bed mobility and AROM in R hip  Patient to benefit from skilled PT consisting of stretching, strengthening, neuromuscular re-education, transfer training, and massage to address deficits in strength and ROM, and decrease pain  Impairments: abnormal coordination, abnormal muscle firing, abnormal muscle tone, abnormal or restricted ROM, activity intolerance, impaired physical strength, pain with function and poor body mechanics    Goals  STG (2-4 weeks)  1  Patient will have an improvement in body mechanics and muscular strength to complete car transfers without pain in 4 weeks  2  Patient will have a decrease pain rating from a 6/10 to a 4/10 on the NPRS to improve quality of life and help with functional mobility  3  Patient will have an increase in R hip abduction ROM to 40 degrees to improve ability to complete car transfers in 4 weeks  LTG (6-8 weeks)  1  Patient will be able to complete all household activities without pain in 8 weeks to improve quality of life    2  Patient will be able to perform line dancing class without pain in 8 weeks to improve quality of life  3  Patient will have an increase in FOTO score to anticipated or greater  Plan  Patient would benefit from: skilled physical therapy  Planned modality interventions: cryotherapy and TENS  Planned therapy interventions: joint mobilization, abdominal trunk stabilization, manual therapy, massage, balance, neuromuscular re-education, body mechanics training, patient education, postural training, strengthening, stretching, flexibility, therapeutic exercise, transfer training and home exercise program  Frequency: 1x week  Plan of Care beginning date: 2022  Plan of Care expiration date: 2023  Treatment plan discussed with: patient        Subjective Evaluation    History of Present Illness  Onset date:   Mechanism of injury: Patient presented to PT with hip pain limiting function and mobility  She stated that she has arthritis and has had pain for about a year  She states that she has gotten injections in the past but it has not helped much  Her pain at rest is a 0/10 on the NPRS but increases to a 6/10 at its worst  She takes tylenol to manage her pain  Her pain varies from day to day, and lasts about 10-15 minutes until it decreases  She does not work, but has difficulty with getting in and out of vehicles, ascending/descending stairs, working out, [employer], recreational activities, yard work, and household activities due to pain  Rest, elevation, medication, and ice make her pain better  She denies any changes to bowel and bladder habits, chest pain, night pain, or fever  She denies any other health or medication changes  Patient stated that she wants to get back to doing the activities she enjoys without pain            Recurrent probem    Pain  Current pain ratin  At best pain ratin  At worst pain ratin  Quality: dull ache  Relieving factors: change in position, ice, relaxation, rest and medications  Aggravating factors: stair climbing, walking and lifting  Progression: no change    Treatments  Previous treatment: physical therapy  Patient Goals  Patient goals for therapy: increased strength, return to sport/leisure activities, decreased pain and increased motion          Objective     Concurrent Complaints  Negative for night pain, disturbed sleep, bladder dysfunction, bowel dysfunction and saddle (S4) numbness    Palpation     Right   Hypertonic in the gluteus medius and TFL  Tenderness     Left Hip   Tenderness in the ASIS       Right Hip   Tenderness in the ASIS, PSIS, greater trochanter and iliac crest      Neurological Testing     Sensation     Lumbar   Left   Intact: light touch    Right   Intact: light touch    Hip   Left Hip   Intact: light touch    Right Hip   Intact: light touch    Knee   Left Knee   Intact: light touch    Right Knee   Intact: light touch     Reflexes   Left   Patellar (L4): normal (2+)  Achilles (S1): normal (2+)    Right   Patellar (L4): normal (2+)  Achilles (S1): normal (2+)    Active Range of Motion   Cervical/Thoracic Spine       Thoracic    Flexion:  WFL  Extension:  WFL  Left lateral flexion:  WFL  Right lateral flexion:  WFL  Left rotation:  L  Right rotation:  [facility]    Lumbar   Flexion:  WFL  Extension:  WFL  Left lateral flexion:  WFL  Right lateral flexion:  WFL  Left rotation:  WFL  Right rotation:  WFL  Left Hip   Normal active range of motion    Right Hip   Flexion: WFL  Extension: WFL  Abduction: 15 degrees   External rotation (90/90): 24 degrees with pain  Internal rotation (90/90): 14 degrees with pain  Left Knee   Normal active range of motion    Right Knee   Normal active range of motion    Passive Range of Motion     Right Hip   Abduction: 20 degrees with pain  Left Knee   Normal passive range of motion    Right Knee   Normal passive range of motion    Strength/Myotome Testing     Left Hip   Planes of Motion   Flexion: 4-  Extension: 4+  Abduction: 5  Adduction: 5  External rotation: 4  Internal rotation: 5    Right Hip   Planes of Motion   Flexion: 3+  Extension: 4+  Abduction: 5  Adduction: 5  External rotation: 4 (Pain)  Internal rotation: 5 (Pain)    Left Knee   Normal strength    Right Knee   Normal strength    Left Ankle/Foot   Dorsiflexion: 5  Plantar flexion: 5    Right Ankle/Foot   Dorsiflexion: 5  Plantar flexion: 5    Tests     Right Hip   Positive JENNIFER, FADIR and [place]  Negative long sit  Functional Assessment      Squat    Left valgus, left tibial anterior translation beyond toes, right valgus and right tibial anterior translation beyond toes       5x STS  16 98s         Precautions: GERD      Manuals             Distraction             IASTM travel stick                                       Neuro Re-Ed             PPT HEP            SLR             Bridges                                                                 Ther Ex             Clam Shells HEP            Hamstring Stretch             Standing IT Band Stretch HEP            Piriformis Stretch             Sidestepping             Mini Squats                                       Ther Activity                                       Gait Training                                       Modalities

## 2022-11-14 NOTE — LETTER
2022    Gail Munoz DO  [facility]    Patient: Iman Grimes   YOB: 1945   Date of Visit: 2022     Encounter Diagnosis     ICD-10-CM    1  Right hip pain  M25 551        Dear Dr Lucas Mansfield:    Thank you for your recent referral of Iman Grimes  Please review the attached evaluation summary from Yumiok's recent visit  Please verify that you agree with the plan of care by signing the attached order  If you have any questions or concerns, please do not hesitate to call  I sincerely appreciate the opportunity to share in the care of one of your patients and hope to have another opportunity to work with you in the near future  Sincerely,    Edgard Cheng, PT      Referring Provider:      I certify that I have read the below Plan of Care and certify the need for these services furnished under this plan of treatment while under my care  Gail Munoz DO  [address] [address]  Via Fax: [phone]          PT Evaluation     Today's date: 2022  Patient name: Iman Grimes  : 1945  MRN: [id number]  Referring provider: Krystian Dempsey DO  Dx:   Encounter Diagnosis     ICD-10-CM    1  Right hip pain  M25 551        Start Time: 1400  Stop Time: 1445  Total time in clinic (min): 45 minutes    Assessment  Assessment details: Patient is a 78year old female presenting to outpatient PT with a referral by Dr Lucas Mansfield for a diagnosis of Right Hip Pain  Patients  Patient presented with limited ROM and strength secondary to pain  Valgus collapse and tibial translation over toes present during functional squat  Pain during active R LE abduction and flexion that improved with PROM affecting car transfer and bed mobility  Patient's deficits affect her ability to perform household activities, yard work, and ADLs without pain   Difficulty and pain demonstrated with bed mobility and AROM in R hip  Patient to benefit from skilled PT consisting of stretching, strengthening, neuromuscular re-education, transfer training, and massage to address deficits in strength and ROM, and decrease pain  Impairments: abnormal coordination, abnormal muscle firing, abnormal muscle tone, abnormal or restricted ROM, activity intolerance, impaired physical strength, pain with function and poor body mechanics    Goals  STG (2-4 weeks)  1  Patient will have an improvement in body mechanics and muscular strength to complete car transfers without pain in 4 weeks  2  Patient will have a decrease pain rating from a 6/10 to a 4/10 on the NPRS to improve quality of life and help with functional mobility  3  Patient will have an increase in R hip abduction ROM to 40 degrees to improve ability to complete car transfers in 4 weeks  LTG (6-8 weeks)  1  Patient will be able to complete all household activities without pain in 8 weeks to improve quality of life  2  Patient will be able to perform line dancing class without pain in 8 weeks to improve quality of life  3  Patient will have an increase in FOTO score to anticipated or greater  Plan  Patient would benefit from: skilled physical therapy  Planned modality interventions: cryotherapy and TENS  Planned therapy interventions: joint mobilization, abdominal trunk stabilization, manual therapy, massage, balance, neuromuscular re-education, body mechanics training, patient education, postural training, strengthening, stretching, flexibility, therapeutic exercise, transfer training and home exercise program  Frequency: 1x week  Plan of Care beginning date: 11/14/2022  Plan of Care expiration date: 1/9/2023  Treatment plan discussed with: patient        Subjective Evaluation    History of Present Illness  Onset date: 2021  Mechanism of injury: Patient presented to PT with hip pain limiting function and mobility   She stated that she has arthritis and has had pain for about a year  She states that she has gotten injections in the past but it has not helped much  Her pain at rest is a 0/10 on the NPRS but increases to a 6/10 at its worst  She takes tylenol to manage her pain  Her pain varies from day to day, and lasts about 10-15 minutes until it decreases  She does not work, but has difficulty with getting in and out of vehicles, ascending/descending stairs, working out, [place], recreational activities, yard work, and household activities due to pain  Rest, elevation, medication, and ice make her pain better  She denies any changes to bowel and bladder habits, chest pain, night pain, or fever  She denies any other health or medication changes  Patient stated that she wants to get back to doing the activities she enjoys without pain  Recurrent probem    Pain  Current pain ratin  At best pain ratin  At worst pain ratin  Quality: dull ache  Relieving factors: change in position, ice, relaxation, rest and medications  Aggravating factors: stair climbing, walking and lifting  Progression: no change    Treatments  Previous treatment: physical therapy  Patient Goals  Patient goals for therapy: increased strength, return to sport/leisure activities, decreased pain and increased motion          Objective     Concurrent Complaints  Negative for night pain, disturbed sleep, bladder dysfunction, bowel dysfunction and saddle (S4) numbness    Palpation     Right   Hypertonic in the gluteus medius and TFL  Tenderness     Left Hip   Tenderness in the ASIS       Right Hip   Tenderness in the ASIS, PSIS, greater trochanter and iliac crest      Neurological Testing     Sensation     Lumbar   Left   Intact: light touch    Right   Intact: light touch    Hip   Left Hip   Intact: light touch    Right Hip   Intact: light touch    Knee   Left Knee   Intact: light touch    Right Knee   Intact: light touch     Reflexes   Left   Patellar (L4): normal (2+)  Achilles (S1): normal (2+)    Right   Patellar (L4): normal (2+)  Achilles (S1): normal (2+)    Active Range of Motion   Cervical/Thoracic Spine       Thoracic    Flexion:  WFL  Extension:  WFL  Left lateral flexion:  WFL  Right lateral flexion:  WFL  Left rotation:  WFL  Right rotation:  WFL    Lumbar   Flexion:  WFL  Extension:  WFL  Left lateral flexion:  WFL  Right lateral flexion:  WFL  Left rotation:  WFL  Right rotation:  WFL  Left Hip   Normal active range of motion    Right Hip   Flexion: WFL  Extension: WFL  Abduction: 15 degrees   External rotation (90/90): 24 degrees with pain  Internal rotation (90/90): 14 degrees with pain  Left Knee   Normal active range of motion    Right Knee   Normal active range of motion    Passive Range of Motion     Right Hip   Abduction: 20 degrees with pain  Left Knee   Normal passive range of motion    Right Knee   Normal passive range of motion    Strength/Myotome Testing     Left Hip   Planes of Motion   Flexion: 4-  Extension: 4+  Abduction: 5  Adduction: 5  External rotation: 4  Internal rotation: 5    Right Hip   Planes of Motion   Flexion: 3+  Extension: 4+  Abduction: 5  Adduction: 5  External rotation: 4 (Pain)  Internal rotation: 5 (Pain)    Left Knee   Normal strength    Right Knee   Normal strength    Left Ankle/Foot   Dorsiflexion: 5  Plantar flexion: 5    Right Ankle/Foot   Dorsiflexion: 5  Plantar flexion: 5    Tests     Right Hip   Positive JENNIFER, FADIR and [place]  Negative long sit  Functional Assessment      Squat    Left valgus, left tibial anterior translation beyond toes, right valgus and right tibial anterior translation beyond toes       5x STS  16 98s         Precautions: GERD      Manuals             Distraction             IASTM travel stick                                       Neuro Re-Ed             PPT HEP            SLR             Bridges                                                                 Ther Ex             Clam Shells HEP Hamstring Stretch             Standing IT Band Stretch HEP            Piriformis Stretch             Sidestepping             Mini Squats                                       Ther Activity                                       Gait Training                                       Modalities

## 2022-11-22 ENCOUNTER — APPOINTMENT (OUTPATIENT)
Dept: PHYSICAL THERAPY | Facility: MEDICAL CENTER | Age: 77
End: 2022-11-22

## 2022-12-01 ENCOUNTER — OFFICE VISIT (OUTPATIENT)
Dept: PHYSICAL THERAPY | Facility: MEDICAL CENTER | Age: 77
End: 2022-12-01

## 2022-12-01 DIAGNOSIS — M25.551 RIGHT HIP PAIN: Primary | ICD-10-CM

## 2022-12-01 NOTE — PROGRESS NOTES
Daily Note     Today's date: 2022  Patient name: Víctor Solares  : 1945  MRN: [id number]  Referring provider: Ariel Muñiz DO  Dx:   Encounter Diagnosis     ICD-10-CM    1  Right hip pain  M25 551                      Subjective: Patient stated that she was feeling pretty good today  She stated she was able to do her classes without pain, but had some pain coming in today  She stated that she was sore leaving last session and had a 4/10 on the NPRS coming in today  Objective: See treatment diary below      Assessment: Tolerated treatment well  Hypertonicity and pain decreased throughout IASTM travel stick manual therapy  Patient was unable to complete full mini squat exercise without pain, addressed with decreasing ROM during exercise  No increase in symptoms following exercises  Patient demonstrated fatigue post treatment, exhibited good technique with therapeutic exercises and would benefit from continued PT      Plan: Continue per plan of care  Progress treatment as tolerated         Precautions: GERD      Manuals  2022           Distraction  IB SPT           IASTM travel stick  IB SPT                                     Neuro Re-Ed             PPT HEP            SLR             Bridges                                                                 Ther Ex             Clam Shells HEP            Hamstring Stretch  30" 3x BL           Standing IT Band Stretch HEP            Supine IT Band Stretch  30" 3x BL           Piriformis Stretch  30" 3x BL           Sidestepping             Mini Squats  3x10 w/ TRX           Step Ups  2x10 BL           Lat Step Ups  2x10 BL           Ther Activity                                       Gait Training                                       Modalities

## 2022-12-05 ENCOUNTER — OFFICE VISIT (OUTPATIENT)
Dept: PHYSICAL THERAPY | Facility: MEDICAL CENTER | Age: 77
End: 2022-12-05

## 2022-12-05 DIAGNOSIS — M25.551 RIGHT HIP PAIN: Primary | ICD-10-CM

## 2022-12-05 NOTE — PROGRESS NOTES
Daily Note     Today's date: 2022  Patient name: Trell Chase  : 1945  MRN: [id number]  Referring provider: Santa Tineo DO  Dx:   Encounter Diagnosis     ICD-10-CM    1  Right hip pain  M25 551           Start Time: 1017  Stop Time: 1058  Total time in clinic (min): 41 minutes    Subjective: Pt states continued pain in her right hip  She denies significant changes in symptoms or activities  Objective: See treatment diary below      Assessment: Tolerated treatment well  Minor cues required for decreased trunk flexion when performing TRX squat this session  With cues there is immediate improvement in performance  Patient demonstrated fatigue post treatment and would benefit from continued PT      Plan: Continue per plan of care  Progress treatment as tolerated         Precautions: GERD      Manuals  2022          Distraction  IB SPT CC          IASTM travel stick  IB SPT CC                                    Neuro Re-Ed             PPT HEP            SLR             Bridges   3x10                                                              Ther Ex             Clam Shells HEP            Hamstring Stretch  30" 3x BL 30" 3x BL          Standing IT Band Stretch HEP            Supine IT Band Stretch  30" 3x BL 30" 3x BL          Piriformis Stretch  30" 3x BL 30" 3x BL          Sidestepping             Mini Squats  3x10 w/ TRX 3x10 w/ TRX          Step Ups  2x10 BL 2x10 BL 6"          Lat Step Ups  2x10 BL 2x10 BL 6"          Ther Activity                                       Gait Training                                       Modalities

## 2022-12-12 ENCOUNTER — OFFICE VISIT (OUTPATIENT)
Dept: PHYSICAL THERAPY | Facility: MEDICAL CENTER | Age: 77
End: 2022-12-12

## 2022-12-12 DIAGNOSIS — M25.551 RIGHT HIP PAIN: Primary | ICD-10-CM

## 2022-12-12 NOTE — PROGRESS NOTES
PT Re-Evaluation     Today's date: 2022  Patient name: Amanda Arce  : 1945  MRN: [id number]  Referring provider: Belkis Rodriguez DO  Dx:   Encounter Diagnosis     ICD-10-CM    1  Right hip pain  M25 551           Start Time: 0800  Stop Time: 0845  Total time in clinic (min): 45 minutes    Assessment  Assessment details: 2022  Patient is a 78year old female presenting to outpatient PT with a referral by Dr Lia Dougherty for a diagnosis of Right Hip Pain  Patients  Patient presented with limited ROM and strength secondary to pain  Patient has attended 2 session of physical therapy and is progressing towards PT and patient stated goals  Upon Reexamination, patient presents with improvements in LE strength and ROM  Patient still presents with pain in end-range and following exercise  Deficits still present in R LE ROM and strength  These deficits continue to affect her ability to perform household activities, yard work, and ADLs without pain  Patient to benefit from skilled PT to address ROM and strength limitation in R LE       2022  Patient is a 78year old female presenting to outpatient PT with a referral by Dr Lia Dougherty for a diagnosis of Right Hip Pain  Patients  Patient presented with limited ROM and strength secondary to pain  Valgus collapse and tibial translation over toes present during functional squat  Pain during active R LE abduction and flexion that improved with PROM affecting car transfer and bed mobility  Patient's deficits affect her ability to perform household activities, yard work, and ADLs without pain  Difficulty and pain demonstrated with bed mobility and AROM in R hip  Patient to benefit from skilled PT consisting of stretching, strengthening, neuromuscular re-education, transfer training, and massage to address deficits in strength and ROM, and decrease pain       Impairments: abnormal coordination, abnormal muscle firing, abnormal muscle tone, abnormal or restricted ROM, activity intolerance, impaired physical strength, pain with function and poor body mechanics    Goals  STG (2-4 weeks)  1  Patient will have an improvement in body mechanics and muscular strength to complete car transfers without pain in 4 weeks  PROGRESSING  2  Patient will have a decrease pain rating from a 6/10 to a 4/10 on the NPRS to improve quality of life and help with functional mobility  PROGRESSING  3  Patient will have an increase in R hip abduction ROM to 30 degrees to improve ability to complete car transfers in 4 weeks  PROGRESSING    LTG (6-8 weeks)  1  Patient will be able to complete all household activities without pain in 8 weeks to improve quality of life  PROGRESSING  2  Patient will be able to perform line dancing class without pain in 8 weeks to improve quality of life  PROGRESSING  3  Patient will have an increase in FOTO score to anticipated or greater  [place]      Plan  Patient would benefit from: skilled physical therapy  Planned modality interventions: cryotherapy and TENS  Planned therapy interventions: joint mobilization, abdominal trunk stabilization, manual therapy, massage, balance, neuromuscular re-education, body mechanics training, patient education, postural training, strengthening, stretching, flexibility, therapeutic exercise, transfer training and home exercise program  Frequency: 1x week  Plan of Care beginning date: 11/14/2022  Plan of Care expiration date: 1/9/2023  Treatment plan discussed with: patient        Subjective Evaluation    History of Present Illness  Onset date: 2021  Mechanism of injury: 12/12/2022  Patient stated that she is feeling sore today  She stated that she is having back pain that started over the weekend  She said she was doing a lot of work and bending over to get ready for [employer]  She still continues to have hip pain  She stated that she does not really have pain during her exercise classes   She says she has good days and bad days, and today she is sore  2022  Patient presented to PT with hip pain limiting function and mobility  She stated that she has arthritis and has had pain for about a year  She states that she has gotten injections in the past but it has not helped much  Her pain at rest is a 0/10 on the NPRS but increases to a 6/10 at its worst  She takes tylenol to manage her pain  Her pain varies from day to day, and lasts about 10-15 minutes until it decreases  She does not work, but has difficulty with getting in and out of vehicles, ascending/descending stairs, working out, [place], recreational activities, yard work, and household activities due to pain  Rest, elevation, medication, and ice make her pain better  She denies any changes to bowel and bladder habits, chest pain, night pain, or fever  She denies any other health or medication changes  Patient stated that she wants to get back to doing the activities she enjoys without pain  Recurrent probem    Pain  Current pain ratin  At best pain ratin  At worst pain ratin  Quality: dull ache  Relieving factors: change in position, ice, relaxation, rest and medications  Aggravating factors: stair climbing, walking and lifting  Progression: no change    Treatments  Previous treatment: physical therapy  Patient Goals  Patient goals for therapy: increased strength, return to sport/leisure activities, decreased pain and increased motion          Objective     Concurrent Complaints  Negative for night pain, disturbed sleep, bladder dysfunction, bowel dysfunction and saddle (S4) numbness    Palpation     Right   Hypertonic in the gluteus medius and TFL  Tenderness     Left Hip   Tenderness in the ASIS       Right Hip   Tenderness in the ASIS, PSIS, greater trochanter and iliac crest      Neurological Testing     Sensation     Lumbar   Left   Intact: light touch    Right   Intact: light touch    Hip   Left Hip   Intact: light touch    Right Hip   Intact: light touch    Knee   Left Knee   Intact: light touch    Right Knee   Intact: light touch     Reflexes   Left   Patellar (L4): normal (2+)  Achilles (S1): normal (2+)    Right   Patellar (L4): normal (2+)  Achilles (S1): normal (2+)    Active Range of Motion   Cervical/Thoracic Spine       Thoracic    Flexion:  WFL  Extension:  WFL  Left lateral flexion:  WFL  Right lateral flexion:  WFL  Left rotation:  WFL  Right rotation:  [facility]    Lumbar   Flexion:  WFL  Extension:  WFL  Left lateral flexion:  WFL  Right lateral flexion:  WFL  Left rotation:  WFL  Right rotation:  WFL  Left Hip   Normal active range of motion    Right Hip   Flexion: 100 degrees   Abduction: 15 degrees   External rotation (90/90): 30 degrees   Internal rotation (90/90): 29 degrees with pain  Left Knee   Normal active range of motion    Right Knee   Normal active range of motion    Passive Range of Motion     Right Hip   Flexion: 115 degrees   Abduction: 22 degrees with pain  Left Knee   Normal passive range of motion    Right Knee   Normal passive range of motion    Strength/Myotome Testing     Left Hip   Planes of Motion   Flexion: 4-  Extension: 5  Abduction: 5  Adduction: 5  External rotation: 4+  Internal rotation: 5    Right Hip   Planes of Motion   Flexion: 4-  Extension: 5  Abduction: 5  Adduction: 5  External rotation: 4+  Internal rotation: 5 (Pain)    Left Knee   Normal strength    Right Knee   Normal strength    Left Ankle/Foot   Dorsiflexion: 5  Plantar flexion: 5    Right Ankle/Foot   Dorsiflexion: 5  Plantar flexion: 5    Tests     Right Hip   Positive JENNIFER, FADIR, long sit and [place]  Functional Assessment      Squat    Left valgus, left tibial anterior translation beyond toes, right valgus and right tibial anterior translation beyond toes       5x STS  16 98s         Precautions: GERD    PT 1:1 0826--845  Manuals  12/01/2022 12/05/2022 12/12/2022         Distraction  IB SPT CC IB SPT         IASTM travel stick  IB SPT CC IB SPT                                   Neuro Re-Ed             PPT HEP            SLR             Bridges   3x10 2x10                                                             Ther Ex             Clam Shells HEP            Hamstring Stretch  30" 3x BL 30" 3x BL 30" 5x BL         Standing IT Band Stretch HEP            Supine IT Band Stretch  30" 3x BL 30" 3x BL          Piriformis Stretch  30" 3x BL 30" 3x BL          Quad stretch    30" 3x BL         Sidestepping             Mini Squats  3x10 w/ TRX 3x10 w/ TRX          Step Ups  2x10 BL 2x10 BL 6"          Lat Step Ups  2x10 BL 2x10 BL 6"          Ther Activity                                       Gait Training                                       Modalities

## 2022-12-20 ENCOUNTER — OFFICE VISIT (OUTPATIENT)
Dept: PHYSICAL THERAPY | Facility: MEDICAL CENTER | Age: 77
End: 2022-12-20

## 2022-12-20 DIAGNOSIS — M25.551 RIGHT HIP PAIN: Primary | ICD-10-CM

## 2022-12-20 NOTE — PROGRESS NOTES
Daily Note     Today's date: 2022  Patient name: Yaz Blank  : 1945  MRN: [id number]  Referring provider: Salvador Barksdale DO  Dx:   Encounter Diagnosis     ICD-10-CM    1  Right hip pain  M25 551           Start Time: 929  Stop Time:   Total time in clinic (min): 31 minutes    Subjective: Pt denies significant change in function  She states she is not currently attending exercise class due to holiday schedule and class not being offered  Objective: See treatment diary below      Assessment: Tolerated treatment well  Patient denied pain with exercise  Pt was educated on how to best modify her activities, house hold duties, her cleaning, and general activity to best mangfe her symptoms  Pt states understanding to this  She admits she must do a better job in managing her symptoms as she notes an improvement in her symptoms when she has done this in the past  Given patient progress, independence with HEP and understanding of activity modification, patient is agreeable to discharge from skilled PT services today  Patient demonstrated fatigue post treatment and exhibited good technique with therapeutic exercises      Plan: Discharge today        Precautions: GERD    PT 1:1   Manuals  2022          Distraction  IB SPT CC IB SPT         IASTM travel stick  IB SPT CC IB SPT                                   Neuro Re-Ed             PPT HEP            SLR             Bridges   3x10 2x10 3x10        Sidelying Hip Abd     3x10 BL                                               Ther Ex             Clam Shells HEP            Hamstring Stretch  30" 3x BL 30" 3x BL 30" 5x BL         Standing IT Band Stretch HEP            Supine IT Band Stretch  30" 3x BL 30" 3x BL          Piriformis Stretch  30" 3x BL 30" 3x BL          Quad stretch    30" 3x BL         Sidestepping             Mini Squats  3x10 w/ TRX 3x10 w/ TRX  To table w/ 10# KB 3x10        Step Ups  2x10 BL 2x10 BL 6"          Sled Push/Pull     Sled + 20# 25' 3x        Lat Step Ups  2x10 BL 2x10 BL 6"          Ther Activity             Pt Ed     activity modifcation, activity pacing, home set up, exacerbating and relieving factors                     Gait Training                                       Modalities

## 2023-03-27 DIAGNOSIS — Z78.0 MENOPAUSE: ICD-10-CM

## 2023-03-27 RX ORDER — ESTRADIOL 0.5 MG/1
TABLET ORAL
Qty: 90 TABLET | Refills: 0 | Status: SHIPPED | OUTPATIENT
Start: 2023-03-27

## 2023-03-27 NOTE — TELEPHONE ENCOUNTER
Patient called in for a Mammo script and a refill today for her Estrace to [facility] in [place]   Patient rescheduled her yearly today

## 2023-03-29 ENCOUNTER — EVALUATION (OUTPATIENT)
Dept: PHYSICAL THERAPY | Facility: MEDICAL CENTER | Age: 78
End: 2023-03-29

## 2023-03-29 DIAGNOSIS — M25.512 ACUTE PAIN OF LEFT SHOULDER: ICD-10-CM

## 2023-03-29 DIAGNOSIS — M54.12 CERVICAL RADICULOPATHY: Primary | ICD-10-CM

## 2023-03-29 NOTE — PROGRESS NOTES
PT Evaluation     Today's date: 3/29/2023  Patient name: Benita Concepcion  : 1945  MRN: [id number]  Referring provider: Violet Hairston, *  Dx:   Encounter Diagnosis     ICD-10-CM    1  Cervical radiculopathy  M54 12       2  Acute pain of left shoulder  M25 512                      Assessment  Assessment details: Pt is an alert and oriented 77 yo female, LHD, referred to out-pt PT with acute onset of L shoulder pain that started approx 2 weeks ago without injury  Pt states she did go for a walk and had a heavy purse on her L shoulder  Pt states she finished a course of steroids which did not provide any relief  Pt states her skin is sensitive to touch  Pt does have h/o cervical stenosis  Pt states seeing neurologist tomorrow for a 2nd opinion  Pt states pain is severe and worse with sitting and having sig difficulty sleeping due to pain  Pt also states having numbness into L UE and her L hand gets cold  Upon examination, pt demonstrates pain, tissue tenderness, mild decreased cervical strength and ROM however more weakness in L hand than shoulder, pt changes positions frequently throughout exam, decreased  strength on L, postural deficits and functional limitations  Pt will benefit from skilled PT to address the deficits listed above    Thank you for your referral   Impairments: abnormal muscle firing, abnormal muscle tone, abnormal or restricted ROM, activity intolerance, impaired physical strength, lacks appropriate home exercise program, pain with function and poor posture   Understanding of Dx/Px/POC: good   Prognosis: good    Goals  ST: Decrease pain by 25% in 4 weeks with all functional activities  2: Improved ROM by 25% in 4 weeks to assist with all functional activities  3: Improve strength by 1/2 grade in 4 weeks to assist with all functional activities  LT:  Decrease pain to 0-1/10 with all functional activities in 4-6 weeks  2: Improved ROM to WFL's in 4-6 weeks to assist with all functional activities  3:  Increase strength to WFL's in 4-6 weeks to assist with all functional activities    Plan  Patient would benefit from: skilled physical therapy  Planned modality interventions: cryotherapy and thermotherapy: hydrocollator packs  Planned therapy interventions: joint mobilization, manual therapy, neuromuscular re-education, patient education, postural training, strengthening, stretching, therapeutic activities, therapeutic exercise and home exercise program  Frequency: 2x week  Duration in weeks: 6  Plan of Care beginning date: 3/29/2023  Plan of Care expiration date: 5/10/2023  Treatment plan discussed with: patient        Subjective Evaluation    Pain  Current pain ratin  At best pain ratin  At worst pain rating: 10  Quality: radiating, sharp, dull ache and knife-like  Aggravating factors: sitting    Hand dominance: left      Diagnostic Tests  No diagnostic tests performed  Treatments  Current treatment: physical therapy  Patient Goals  Patient goals for therapy: increased strength, independence with ADLs/IADLs, decreased pain, increased motion and return to sport/leisure activities          Objective     Postural Observations    Additional Postural Observation Details  Posture/observation:  79 yo female, decreased cervical lordosis, mild forward head and scap protraction in sitting, pt changes position frequently during exam stands up and leans forward to relieve pain    Palpation     Additional Palpation Details  Palpation:  Pain/tenderness L post shoulder, teres musculature, C4-T2 segments    Neurological Testing     Sensation   Cervical/Thoracic   Left   Intact: light touch    Right   Intact: light touch    Reflexes   Left   Biceps (C5/C6): trace (1+)  Brachioradialis (C6): trace (1+)  Triceps (C7): normal (2+)    Additional Neurological Details  Pt reports L hand feels numb    Active Range of Motion   Cervical/Thoracic Spine       Cervical    Flexion: 30 degrees Extension: 25 degrees     with pain Restriction level: moderate  Left lateral flexion: 14 degrees      Right lateral flexion: 15 degrees      Left rotation: 50 degrees  Right rotation: 65 degrees             Strength/Myotome Testing   Cervical Spine   Neck extension: 4  Neck flexion: 4    Left   Neck lateral flexion (C3): 4    Right   Neck lateral flexion (C3): 4    Left Shoulder     Planes of Motion   Flexion: 4   Extension: 4   Abduction: 4   External rotation at 0°: 4     Right Shoulder     Planes of Motion   Flexion: 4   Extension: 4   Abduction: 4   External rotation at 0°: 4     Left Elbow   Flexion: 4  Extension: 4    Right Elbow   Flexion: 4  Extension: 4    Left Wrist/Hand   Wrist extension: 4-  Wrist flexion: 4-  Ulnar deviation: 4-    Additional Strength Details   strength R= 50lbs   L= 20lbs (pt is LHD)             Precautions: HTN, OA, GERD      Manuals 3/29            cerv distraction BG                                                   Neuro Re-Ed                                                                                                        Ther Ex 3/29            cerv retract HEP            scap retract HEP                                                                                          Ther Activity                                       Gait Training                                       Modalities             traction As geovanni

## 2023-04-05 ENCOUNTER — OFFICE VISIT (OUTPATIENT)
Dept: PHYSICAL THERAPY | Facility: MEDICAL CENTER | Age: 78
End: 2023-04-05

## 2023-04-05 DIAGNOSIS — M54.12 CERVICAL RADICULOPATHY: Primary | ICD-10-CM

## 2023-04-05 DIAGNOSIS — M25.512 ACUTE PAIN OF LEFT SHOULDER: ICD-10-CM

## 2023-04-05 NOTE — PROGRESS NOTES
"Daily Note     Today's date: 2023  Patient name: Tarik Taylor  : 1945  MRN: [id number]  Referring provider: Justice Sharma, *  Dx:   Encounter Diagnosis     ICD-10-CM    1  Cervical radiculopathy  M54 12       2  Acute pain of left shoulder  M25 512                      Subjective: Pt states seeing Dr Remy Spear, and has a new RX for PT to include cervical traction, stretching/strengthening, etc to POC for cervical radiculopathy  Pt was also given prednisone, which she thinks has been sig helping  Pt states she was able to sleep for 3 consecutive hrs last night  Objective: See treatment diary below      Assessment: Tolerated treatment fair  Patient demonstrated fatigue post treatment and would benefit from continued PT  Initiated gentle stretching and strengthening today and pt provided with RTB for home  Assess sx's nv to see how she felt after traction throughout the ramainder of her night  Plan: Continue per plan of care        Precautions: HTN, OA, GERD      Manuals 3/29 4/5           cerv distraction BG BG           MFR L UT, levtor, rhomboid  BG                                     Neuro Re-Ed                                                                                                        Ther Ex 3/29 4/5           cerv retract HEP 3\"x20           scap retract HEP 5\"x20           SNAGS             UBE retro  x3'           TB ext  RTB 5\"x20           TB b/l ER  RTB 5\"x20           UT stretch on L  20\"x4           pec stretch             Lev stretch on L  20\"x4                                     Gait Training                                       Modalities  4/5           Traction static 3 steps As geovanni 15-20# x10'                             "

## 2023-04-28 ENCOUNTER — OFFICE VISIT (OUTPATIENT)
Dept: PHYSICAL THERAPY | Facility: MEDICAL CENTER | Age: 78
End: 2023-04-28

## 2023-04-28 DIAGNOSIS — M25.512 ACUTE PAIN OF LEFT SHOULDER: ICD-10-CM

## 2023-04-28 DIAGNOSIS — M54.12 CERVICAL RADICULOPATHY: Primary | ICD-10-CM

## 2023-04-28 NOTE — PROGRESS NOTES
"Daily Note     Today's date: 2023  Patient name: Leanne Alcocer  : 1945  MRN: [id number]  Referring provider: Rory Jimenez, *  Dx:   Encounter Diagnosis     ICD-10-CM    1  Cervical radiculopathy  M54 12       2  Acute pain of left shoulder  M25 512           Start Time: 0820  Stop Time: 0900  Total time in clinic (min): 40 minutes    Subjective: Pt states having minor pain in her neck today along with continued numbness in her left hand  Objective: See treatment diary below      Assessment: Tolerated treatment fair  Patient demonstrated fatigue post treatment and would benefit from continued PT  TTP present at both Left sided upper trap and periscapular muscles  Left hand numbness remains relatively constant throughout the session today  Plan: Continue per plan of care        Precautions: HTN, OA, GERD    PT 1:1 time 1520-1118  Manuals 3/29 4/5 4/14 4/21 4/28        cerv distraction BG BG BG BG         MFR L UT, levtor, rhomboid  BG BG BG TE                                  Neuro Re-Ed                                                                                                        Ther Ex 3/29 4/5 4/14 4/21 4/28        cerv retract HEP 3\"x20 3\"x20 3\"x20 3\"x20        scap retract HEP 5\"x20 5\"x20 5\"x20 5\"x20        SNAGS             UBE retro  x3'           TB ext  RTB 5\"x20           TB b/l ER  RTB 5\"x20           UT stretch on L  20\"x4 20\"x4 20\"x4 20\"x4        pec stretch             Lev stretch on L  20\"x4 20\"x4 20\"x4 20\"x4                                  Gait Training                                       Modalities          Traction static 3 steps As geovanni 15-20# x10' 20#x10' 20# x10' 20# x10'                          "

## 2023-05-19 ENCOUNTER — HOSPITAL ENCOUNTER (OUTPATIENT)
Dept: RADIOLOGY | Age: 78
Discharge: HOME | End: 2023-05-19

## 2023-05-19 VITALS — HEIGHT: 67 IN | WEIGHT: 190 LBS | BODY MASS INDEX: 29.82 KG/M2

## 2023-05-19 DIAGNOSIS — Z12.31 SCREENING MAMMOGRAM FOR HIGH-RISK PATIENT: ICD-10-CM

## 2023-07-27 ENCOUNTER — OFFICE VISIT (OUTPATIENT)
Dept: GASTROENTEROLOGY | Facility: CLINIC | Age: 78
End: 2023-07-27
Payer: MEDICARE

## 2023-07-27 VITALS
SYSTOLIC BLOOD PRESSURE: 170 MMHG | DIASTOLIC BLOOD PRESSURE: 77 MMHG | HEIGHT: 67 IN | BODY MASS INDEX: 31.86 KG/M2 | HEART RATE: 61 BPM | WEIGHT: 203 LBS

## 2023-07-27 DIAGNOSIS — K62.5 RECTAL BLEEDING: Primary | ICD-10-CM

## 2023-07-27 DIAGNOSIS — K64.8 INTERNAL HEMORRHOIDS: ICD-10-CM

## 2023-07-27 PROCEDURE — 99213 OFFICE O/P EST LOW 20 MIN: CPT | Performed by: INTERNAL MEDICINE

## 2023-07-27 RX ORDER — NYSTATIN 100000 U/G
CREAM TOPICAL
COMMUNITY
Start: 2023-06-30

## 2023-07-27 RX ORDER — PREDNISONE 5 MG/1
TABLET ORAL
COMMUNITY
Start: 2023-06-16

## 2023-07-27 RX ORDER — LORAZEPAM 0.5 MG/1
0.5 TABLET ORAL EVERY 6 HOURS PRN
COMMUNITY

## 2023-08-04 DIAGNOSIS — K21.9 GASTROESOPHAGEAL REFLUX DISEASE WITHOUT ESOPHAGITIS: ICD-10-CM

## 2023-08-04 RX ORDER — FAMOTIDINE 20 MG/1
20 TABLET, FILM COATED ORAL DAILY
Qty: 90 TABLET | Refills: 3 | Status: SHIPPED | OUTPATIENT
Start: 2023-08-04

## 2023-08-11 ENCOUNTER — NURSE TRIAGE (OUTPATIENT)
Age: 78
End: 2023-08-11

## 2023-08-11 DIAGNOSIS — K62.5 RECTAL BLEEDING: ICD-10-CM

## 2023-08-11 NOTE — TELEPHONE ENCOUNTER
Spoke to patient regarding her non-constant bleeding. Advised prep- h twice daily for 10 days per Dr. William Weir if still bleeding in 2-3 days she should have the bloodwork done and call office for sooner appt per Dr. Eliot zheng [facility] if we do not have an opening. Patient is aware has office visit 8/22/23. Patient agreeable to this plan.

## 2023-08-11 NOTE — TELEPHONE ENCOUNTER
Reason for Disposition  • MODERATE rectal bleeding (small blood clots, passing blood without stool, or toilet water turns red)    Answer Assessment - Initial Assessment Questions  1. APPEARANCE of BLOOD: "What color is it?" "Is it passed separately, on the surface of the stool, or mixed in with the stool?"       Some on the stool and turns toilet water red    2. AMOUNT: "How much blood was passed?"       Varies    3. FREQUENCY: "How many times has blood been passed with the stools?"       Will go a few days without bleeding then will start bleeding again. "depends on how many times I have a bm. If I do bleed it's usually one episode a day."    4. ONSET: "When was the blood first seen in the stools?" (Days or weeks)       Noticed a few days after procedure 7/27    5. DIARRHEA: "Is there also some diarrhea?" If Yes, ask: "How many diarrhea stools were passed in past 24 hours?"       Occasional loose bowels but that is normal    6. CONSTIPATION: "Do you have constipation?" If Yes, ask: "How bad is it?"      Denies    7. RECURRENT SYMPTOMS: "Have you had blood in your stools before?" If Yes, ask: "When was the last time?" and "What happened that time?"       Yes    8. BLOOD THINNERS: "Do you take any blood thinners?" (e.g., Coumadin/warfarin, Pradaxa/dabigatran, aspirin)      Denies    9. OTHER SYMPTOMS: "Do you have any other symptoms?"  (e.g., abdominal pain, vomiting, dizziness, fever)      Some cramping- have history of this.  Feels some irritation    Protocols used: RECTAL BLEEDING-ADULT-

## 2023-08-11 NOTE — TELEPHONE ENCOUNTER
Pt calling with rectal bleeding. Stated was seen in office 7/27 for rubber band ligation for rectal bleeding and hemorrhoids. Has a follow up appointment 8/22 for repeat band ligation. Pt noticed rectal bleeding a few days after procedure. States she does not have this every day. When she does have rectal bleeding it is usually only one episode. It does turn toilet water red, is also on the surface of the stool and very tiny clots. Notes some irritation around rectum and abd cramping which is not unusual. Denies pain, fever, dizziness. Per protocol recommends visit within 24 hours. Please advise is this is appropriate or if you have other recommendations.

## 2023-08-22 ENCOUNTER — OFFICE VISIT (OUTPATIENT)
Dept: GASTROENTEROLOGY | Facility: CLINIC | Age: 78
End: 2023-08-22
Payer: MEDICARE

## 2023-08-22 VITALS
SYSTOLIC BLOOD PRESSURE: 172 MMHG | HEART RATE: 55 BPM | DIASTOLIC BLOOD PRESSURE: 79 MMHG | WEIGHT: 200 LBS | BODY MASS INDEX: 31.39 KG/M2 | HEIGHT: 67 IN

## 2023-08-22 DIAGNOSIS — K64.8 INTERNAL HEMORRHOIDS: ICD-10-CM

## 2023-08-22 DIAGNOSIS — K62.5 RECTAL BLEEDING: Primary | ICD-10-CM

## 2023-08-22 PROCEDURE — 99213 OFFICE O/P EST LOW 20 MIN: CPT | Performed by: INTERNAL MEDICINE

## 2023-08-22 PROCEDURE — 46221 LIGATION OF HEMORRHOID(S): CPT | Performed by: INTERNAL MEDICINE

## 2023-08-22 NOTE — PROGRESS NOTES
Answers for HPI/ROS submitted by the patient on 8/21/2023  Progression since onset: resolved  Pain - numeric: 0/10  arthralgias: Yes  frequency: Yes  hematochezia: Yes  myalgias: Yes    Anal Excision Procedures    Performed by: Winston Heller MD  Authorized by: Winston Heller MD    Universal Protocol:     Verbal consent obtained?: Yes      Risks and benefits: Risks, benefits and alternatives were discussed      Consent given by:  Patient    Patient states understanding of procedure being performed: Yes      Patient's understanding of procedure matches consent: Yes      Procedure consent matches procedure scheduled: Yes      Relevant documents present and verified: Yes      Test results available and properly labeled: Yes      Site marked: No      Radiology Images displayed and confirmed. If images not available, report reviewed: No      Required items: Required blood products, implants, devices and special equipment available      Patient identity confirmed:  Verbally with patient    Time out: Immediately prior to the procedure a time out was called    A time out verifies correct patient, procedure, equipment, support staff and site/side marked as required:   Procedure Type: hemorrhoidectomy    Patient Position:  LPO  Hemorrhoidectomy Details:   Hemorrhoid type: internal    Internal position:  One o'clock  Single rubber band ligation    Patient tolerance:  Patient tolerated the procedure well with no immediate complications  Additiional Procedure Intervention Details:  Patient's status post second rubber band today. Prior band was placed at 11:00 today band was placed at 1:00. She will return in 2 weeks for placement of third band at 6:00.

## 2023-09-18 ENCOUNTER — ANNUAL EXAM (OUTPATIENT)
Dept: OBGYN CLINIC | Facility: CLINIC | Age: 78
End: 2023-09-18
Payer: MEDICARE

## 2023-09-18 VITALS — WEIGHT: 200 LBS | SYSTOLIC BLOOD PRESSURE: 160 MMHG | BODY MASS INDEX: 31.32 KG/M2 | DIASTOLIC BLOOD PRESSURE: 94 MMHG

## 2023-09-18 DIAGNOSIS — Z13.820 ENCOUNTER FOR OSTEOPOROSIS SCREENING IN ASYMPTOMATIC POSTMENOPAUSAL PATIENT: ICD-10-CM

## 2023-09-18 DIAGNOSIS — Z01.419 ENCOUNTER FOR GYNECOLOGICAL EXAMINATION WITHOUT ABNORMAL FINDING: Primary | ICD-10-CM

## 2023-09-18 DIAGNOSIS — Z12.31 ENCOUNTER FOR SCREENING MAMMOGRAM FOR BREAST CANCER: ICD-10-CM

## 2023-09-18 DIAGNOSIS — Z78.0 ENCOUNTER FOR OSTEOPOROSIS SCREENING IN ASYMPTOMATIC POSTMENOPAUSAL PATIENT: ICD-10-CM

## 2023-09-18 DIAGNOSIS — Z78.0 MENOPAUSE: ICD-10-CM

## 2023-09-18 PROBLEM — M72.0 DUPUYTREN'S DISEASE: Status: ACTIVE | Noted: 2022-01-26

## 2023-09-18 PROBLEM — E78.2 MIXED HYPERLIPIDEMIA: Status: ACTIVE | Noted: 2022-09-08

## 2023-09-18 PROBLEM — I10 PRIMARY HYPERTENSION: Status: ACTIVE | Noted: 2023-03-24

## 2023-09-18 PROBLEM — M48.062 SPINAL STENOSIS OF LUMBAR REGION WITH NEUROGENIC CLAUDICATION: Status: ACTIVE | Noted: 2023-01-03

## 2023-09-18 PROBLEM — M19.049 LOCALIZED, PRIMARY OSTEOARTHRITIS OF HAND: Status: ACTIVE | Noted: 2022-01-26

## 2023-09-18 PROCEDURE — G0101 CA SCREEN;PELVIC/BREAST EXAM: HCPCS | Performed by: OBSTETRICS & GYNECOLOGY

## 2023-09-18 RX ORDER — ESTRADIOL 0.5 MG/1
TABLET ORAL
Qty: 90 TABLET | Refills: 3 | Status: SHIPPED | OUTPATIENT
Start: 2023-09-18

## 2023-09-18 NOTE — ASSESSMENT & PLAN NOTE
Pap/HPV not indicated  Mammogram 5/19/2023  Colonoscopy 1/22/21  DEXA ordered    Encourage healthy diet, exercise, Calcium 1200mg per day and at least 800 iu Vitamin D daily.     ERT - will decrease to every third day

## 2023-09-18 NOTE — PROGRESS NOTES
Assessment/Plan:    Encounter for gynecological examination without abnormal finding  Pap/HPV not indicated  Mammogram 2023  Colonoscopy 21  DEXA ordered    Encourage healthy diet, exercise, Calcium 1200mg per day and at least 800 iu Vitamin D daily. ERT - will decrease to every third day           Diagnoses and all orders for this visit:    Encounter for gynecological examination without abnormal finding    Encounter for screening mammogram for breast cancer  -     Mammo screening bilateral w 3d & cad; Future    Encounter for osteoporosis screening in asymptomatic postmenopausal patient  -     DXA bone density spine hip and pelvis; Future    Menopause  -     estradiol (ESTRACE) 0.5 MG tablet; TAKE ONE TABLET BY MOUTH EVERY OTHER DAY          Subjective:      Patient ID: Kizzy Perez is a 70 y.o. female. Patient presents for a routine annual visit  Menarche- 11 Y/O  Last Pap Smear- not indicated ( hysterectomy)    Mammogram- 23 script ordered  Colonoscopy- 21 recall 5 years  DEXA- script ordered     Non smoker  Social drinker  Currently sexually active  No family history of uterine, ovarian, cervical  FX of breast cancer-mother    FYI - Patient experiencing pain elevated BP due to her hip pain, scheduled for a hip replacement. Otherwise no GYN concerns  Discussed ERT typical weaning. Minimal benefit at this time due to age but patient feels well on it. When she has tried to stop it in the past has felt terrible. Palpitations, hot flashes, joint pain. Is currently very active, has hip replacement scheduled soon. Generally travels often and participates in many social events. Wants to continue ERT    Aware of risk of breast cancer, stroke and DVT risk. Wants to continue for now, will try every 3 day dosing. Gynecologic Exam  She reports no genital itching, genital lesions, genital odor, genital rash, pelvic pain, vaginal bleeding or vaginal discharge.  The patient is experiencing no pain. Pertinent negatives include no chills, constipation, diarrhea, fever, nausea, sore throat or vomiting. She is not sexually active. The following portions of the patient's history were reviewed and updated as appropriate:   She  has a past medical history of Arthritis, Diverticulosis, Dysphagia, GERD (gastroesophageal reflux disease), Hypertension, Irregular heart beat, Kidney stone, Post-nasal drip, and Spinal stenosis in cervical region. She   Patient Active Problem List    Diagnosis Date Noted   • Encounter for gynecological examination without abnormal finding 09/18/2023   • Primary hypertension 03/24/2023   • Spinal stenosis of lumbar region with neurogenic claudication 01/03/2023   • Mixed hyperlipidemia 09/08/2022   • Dupuytren's disease 01/26/2022   • Localized, primary osteoarthritis of hand 01/26/2022   • Gastroesophageal reflux disease with esophagitis without hemorrhage 04/08/2021   • Hx of adenomatous colonic polyps 04/08/2021   • Esophageal dysphagia 01/22/2021   • Grade II internal hemorrhoids 01/22/2021     She  has a past surgical history that includes Cataract extraction; Shoulder Debridement; Colonoscopy; Hysterectomy; and Oophorectomy (Bilateral). Her family history includes Breast cancer (age of onset: 48) in her mother; Breast cancer (age of onset: 62) in her maternal aunt; Heart failure in her mother; No Known Problems in her maternal aunt, maternal aunt, maternal aunt, maternal aunt, maternal grandfather, maternal grandmother, and son; Other in her father. She  reports that she has never smoked. She has never used smokeless tobacco. She reports current alcohol use of about 30.0 standard drinks of alcohol per week. She reports that she does not use drugs.   Current Outpatient Medications   Medication Sig Dispense Refill   • Ascorbic Acid (vitamin C) 1000 MG tablet Take 1,000 mg by mouth daily     • betamethasone acetate-betamethasone sodium phosphate (CELESTONE) 6 (3-3) mg/mL betamethasone acetate and sodium phos 6 mg/mL suspension for injection   Co-injected with local anesthetic     • Cholecalciferol (Vitamin D3) 125 MCG (5000 UT) TABS Take 5,000 Units by mouth daily     • Cholecalciferol 25 MCG (1000 UT) capsule Take 1,000 Units by mouth daily     • co-enzyme Q-10 30 MG capsule Take 30 mg by mouth 3 (three) times a day     • COLLAGEN PO Take by mouth     • ELDERBERRY PO Take by mouth     • estradiol (ESTRACE) 0.5 MG tablet TAKE ONE TABLET BY MOUTH EVERY OTHER DAY 90 tablet 3   • famotidine (PEPCID) 20 mg tablet TAKE 1 TABLET BY MOUTH EVERY DAY.  90 tablet 3   • hyoscyamine (LEVSIN/SL) 0.125 mg SL tablet TAKE 1 TABLET UNDER THE TONGUE TWO TIMES DAILY AS NEEDED     • Hyoscyamine Sulfate SL 0.125 MG SUBL hyoscyamine 0.125 mg sublingual tablet   TAKE 1 TABLET UNDER THE TONGUE TWO TIMES DAILY AS NEEDED     • lidocaine (XYLOCAINE) 1 % lidocaine HCl 10 mg/mL (1 %) injection solution   Co-injected with corticosteroid     • LORazepam (ATIVAN) 0.5 mg tablet Take 0.5 mg by mouth every 6 (six) hours as needed     • Magnesium 250 MG TABS Take by mouth     • metoprolol tartrate (LOPRESSOR) 25 mg tablet Take 25 mg by mouth 2 (two) times a day     • nystatin (MYCOSTATIN) cream APPLY TOPICALLY TWO TIMES DAILY     • benzonatate (TESSALON PERLES) 100 mg capsule Take 1 capsule (100 mg total) by mouth 3 (three) times a day as needed for cough 20 capsule 0   • Lactobacillus (Probiotic Acidophilus) TABS Take by mouth     • metoprolol tartrate (LOPRESSOR) 50 mg tablet Take 50 mg by mouth every 12 (twelve) hours (Patient not taking: Reported on 9/18/2023)     • OREGANO PO Take by mouth     • predniSONE 5 mg tablet TAKE 1 TABLET BY MOUTH EVERY DAY OR TWO TIMES DAILY WITH FOOD AS NEEDED FOR NERVE INFLAMMATION     • Probiotic Product (PROBIOTIC-10 PO) Take 1 capsule by mouth daily     • pyridoxine (VITAMIN B6) 100 mg tablet Take 100 mg by mouth daily     • Sodium Hyaluronate (Euflexxa) 20 MG/2ML SOSY 4 mL     • TURMERIC PO Take by mouth       No current facility-administered medications for this visit. Current Outpatient Medications on File Prior to Visit   Medication Sig   • Ascorbic Acid (vitamin C) 1000 MG tablet Take 1,000 mg by mouth daily   • betamethasone acetate-betamethasone sodium phosphate (CELESTONE) 6 (3-3) mg/mL betamethasone acetate and sodium phos 6 mg/mL suspension for injection   Co-injected with local anesthetic   • Cholecalciferol (Vitamin D3) 125 MCG (5000 UT) TABS Take 5,000 Units by mouth daily   • Cholecalciferol 25 MCG (1000 UT) capsule Take 1,000 Units by mouth daily   • co-enzyme Q-10 30 MG capsule Take 30 mg by mouth 3 (three) times a day   • COLLAGEN PO Take by mouth   • ELDERBERRY PO Take by mouth   • famotidine (PEPCID) 20 mg tablet TAKE 1 TABLET BY MOUTH EVERY DAY.    • hyoscyamine (LEVSIN/SL) 0.125 mg SL tablet TAKE 1 TABLET UNDER THE TONGUE TWO TIMES DAILY AS NEEDED   • Hyoscyamine Sulfate SL 0.125 MG SUBL hyoscyamine 0.125 mg sublingual tablet   TAKE 1 TABLET UNDER THE TONGUE TWO TIMES DAILY AS NEEDED   • lidocaine (XYLOCAINE) 1 % lidocaine HCl 10 mg/mL (1 %) injection solution   Co-injected with corticosteroid   • LORazepam (ATIVAN) 0.5 mg tablet Take 0.5 mg by mouth every 6 (six) hours as needed   • Magnesium 250 MG TABS Take by mouth   • metoprolol tartrate (LOPRESSOR) 25 mg tablet Take 25 mg by mouth 2 (two) times a day   • nystatin (MYCOSTATIN) cream APPLY TOPICALLY TWO TIMES DAILY   • [DISCONTINUED] estradiol (ESTRACE) 0.5 MG tablet TAKE ONE TABLET BY MOUTH EVERY OTHER DAY   • benzonatate (TESSALON PERLES) 100 mg capsule Take 1 capsule (100 mg total) by mouth 3 (three) times a day as needed for cough   • Lactobacillus (Probiotic Acidophilus) TABS Take by mouth   • metoprolol tartrate (LOPRESSOR) 50 mg tablet Take 50 mg by mouth every 12 (twelve) hours (Patient not taking: Reported on 9/18/2023)   • OREGANO PO Take by mouth   • predniSONE 5 mg tablet TAKE 1 TABLET BY MOUTH EVERY DAY OR TWO TIMES DAILY WITH FOOD AS NEEDED FOR NERVE INFLAMMATION   • Probiotic Product (PROBIOTIC-10 PO) Take 1 capsule by mouth daily   • pyridoxine (VITAMIN B6) 100 mg tablet Take 100 mg by mouth daily   • Sodium Hyaluronate (Euflexxa) 20 MG/2ML SOSY 4 mL   • TURMERIC PO Take by mouth     No current facility-administered medications on file prior to visit. She has No Known Allergies. .    Review of Systems   Constitutional: Negative for activity change, appetite change, chills, fatigue and fever. HENT: Negative for rhinorrhea, sneezing and sore throat. Eyes: Negative for visual disturbance. Respiratory: Negative for cough, shortness of breath and wheezing. Cardiovascular: Negative for chest pain, palpitations and leg swelling. Gastrointestinal: Negative for abdominal distention, constipation, diarrhea, nausea and vomiting. Genitourinary: Negative for difficulty urinating, pelvic pain and vaginal discharge. Neurological: Negative for syncope and light-headedness. Objective:      /94 (BP Location: Left arm, Patient Position: Sitting, Cuff Size: Standard)   Wt 90.7 kg (200 lb)   BMI 31.32 kg/m²          Physical Exam  Constitutional:       General: She is not in acute distress. Appearance: Normal appearance. She is well-developed. She is not diaphoretic. Chest:   Breasts:     Breasts are symmetrical.      Right: No inverted nipple, mass, nipple discharge, skin change or tenderness. Left: No inverted nipple, mass, nipple discharge, skin change or tenderness. Abdominal:      Palpations: Abdomen is soft. Abdomen is not rigid. Tenderness: There is no abdominal tenderness. There is no guarding or rebound. Hernia: There is no hernia in the left inguinal area. Genitourinary:     Labia:         Right: No rash, tenderness, lesion or injury. Left: No rash, tenderness, lesion or injury. Vagina: No vaginal discharge or bleeding.       Adnexa: Right: No mass, tenderness or fullness. Left: No mass, tenderness or fullness. Comments: Urethral meatus: no lesions, non tender  Urethra: non tender    Vaginal mucosa atrophic  Skin:     General: Skin is warm and dry. Coloration: Skin is not pale. Findings: No erythema or rash.

## 2023-09-22 ENCOUNTER — OFFICE VISIT (OUTPATIENT)
Dept: GASTROENTEROLOGY | Facility: CLINIC | Age: 78
End: 2023-09-22
Payer: MEDICARE

## 2023-09-22 VITALS
HEIGHT: 67 IN | HEART RATE: 67 BPM | WEIGHT: 201 LBS | BODY MASS INDEX: 31.55 KG/M2 | DIASTOLIC BLOOD PRESSURE: 67 MMHG | SYSTOLIC BLOOD PRESSURE: 133 MMHG

## 2023-09-22 DIAGNOSIS — K62.5 RECTAL BLEEDING: Primary | ICD-10-CM

## 2023-09-22 DIAGNOSIS — K64.8 INTERNAL HEMORRHOIDS: ICD-10-CM

## 2023-09-22 PROCEDURE — 99213 OFFICE O/P EST LOW 20 MIN: CPT | Performed by: INTERNAL MEDICINE

## 2023-09-22 PROCEDURE — 46221 LIGATION OF HEMORRHOID(S): CPT | Performed by: INTERNAL MEDICINE

## 2023-09-22 NOTE — PROGRESS NOTES
Anal Excision Procedures    Performed by: Arthur Weiss MD  Authorized by: Arthur Weiss MD    Universal Protocol:     Verbal consent obtained?: Yes      Consent given by:  Patient    Patient states understanding of procedure being performed: Yes      Patient's understanding of procedure matches consent: Yes      Procedure consent matches procedure scheduled: Yes      Relevant documents present and verified: Yes      Test results available and properly labeled: Yes      Site marked: No      Radiology Images displayed and confirmed. If images not available, report reviewed: No      Required items: Required blood products, implants, devices and special equipment available      Patient identity confirmed:  Verbally with patient    Time out: Immediately prior to the procedure a time out was called    A time out verifies correct patient, procedure, equipment, support staff and site/side marked as required:   Procedure Type: hemorrhoidectomy    Patient Position:  LPO  Hemorrhoidectomy Details:   Hemorrhoid type: internal    Internal position:  Six o'clock  Single rubber band ligation    Patient tolerance:  Patient tolerated the procedure well with no immediate complications  Additiional Procedure Intervention Details:  Status post third rubber band ligation for bleeding internal hemorrhoids. Patient will follow-up on a as needed basis regarding rectal bleeding. She can use Tylenol for any discomfort.

## 2023-11-17 PROBLEM — Z01.419 ENCOUNTER FOR GYNECOLOGICAL EXAMINATION WITHOUT ABNORMAL FINDING: Status: RESOLVED | Noted: 2023-09-18 | Resolved: 2023-11-17

## 2024-01-12 ENCOUNTER — TELEPHONE (OUTPATIENT)
Age: 79
End: 2024-01-12

## 2024-01-12 NOTE — TELEPHONE ENCOUNTER
Patients GI provider:  Dr. Caceres, new pt    Number to return call: [phone]    Reason for call: Pt calling back, received call from someone but could not understand the message left. Do not see anything noted on pt's chart. Please call pt back if needed.    Scheduled procedure/appointment date if applicable: Apt 02/02/2024

## 2024-01-29 NOTE — PROGRESS NOTES
Diagnoses and all orders for this visit:    Grade II internal hemorrhoids       Grade II internal hemorrhoids  Patient presents for follow-up of rectal bleeding.  She has been seeing GI medicine for rubber band ligation for symptomatic internal hemorrhoids.  Patient notes she used to have significant bleeding on a frequent basis.  She notes since she has had rubber band ligation performed, her bleeding has essentially stopped.  She notes that 6 weeks ago she did have some blood with bowel movement.  She notes no bleeding since.  Examination shows minimal internal hemorrhoidal disease with perianal skin tags.  She presents today for follow-up.    Given her minimal anatomy and symptoms, I do not have any specific recommendations in terms of surgery or further interventions.  If she develops more regular bleeding, she can be evaluated for repeat banding versus surgery.  Otherwise I recommend high-fiber diet and avoidance of straining.  I will see her back as needed.      HPI  Linda K Frankenfield is here for hemorrhoids, she has had banding in the past.    She reports 5 bowel movement daily that are soft and formed      Her last colonoscopy was 1/22/2021 by Dr. Still and revealed TA x 1, HP x 4 with a 5 yr recall.    Past Medical History:   Diagnosis Date    Arthritis     Diverticulosis     hx of diverticulitis    Dysphagia     GERD (gastroesophageal reflux disease)     hx of gerd but is not on RX    Hypertension     Irregular heart beat     gone after starting metoprolol    Kidney stone     Post-nasal drip     uses flonase    Spinal stenosis in cervical region      Past Surgical History:   Procedure Laterality Date    CATARACT EXTRACTION      COLONOSCOPY      HYSTERECTOMY      age 47    OOPHORECTOMY Bilateral     age 47    SHOULDER DEBRIDEMENT      calcium deposits right side       Current Outpatient Medications:     Ascorbic Acid (vitamin C) 1000 MG tablet, Take 1,000 mg by mouth daily, Disp: , Rfl:      betamethasone acetate-betamethasone sodium phosphate (CELESTONE) 6 (3-3) mg/mL, betamethasone acetate and sodium phos 6 mg/mL suspension for injection  Co-injected with local anesthetic, Disp: , Rfl:     chlorthalidone 25 mg tablet, Take 12.5 mg by mouth daily, Disp: , Rfl:     Cholecalciferol (Vitamin D3) 125 MCG (5000 UT) TABS, Take 5,000 Units by mouth daily, Disp: , Rfl:     Cholecalciferol 25 MCG (1000 UT) capsule, Take 1,000 Units by mouth daily, Disp: , Rfl:     co-enzyme Q-10 30 MG capsule, Take 30 mg by mouth 3 (three) times a day, Disp: , Rfl:     COLLAGEN PO, Take by mouth, Disp: , Rfl:     ELDERBERRY PO, Take by mouth, Disp: , Rfl:     estradiol (ESTRACE) 0.5 MG tablet, TAKE ONE TABLET BY MOUTH EVERY OTHER DAY, Disp: 90 tablet, Rfl: 3    famotidine (PEPCID) 20 mg tablet, TAKE 1 TABLET BY MOUTH EVERY DAY., Disp: 90 tablet, Rfl: 3    Hyoscyamine Sulfate SL 0.125 MG SUBL, hyoscyamine 0.125 mg sublingual tablet  TAKE 1 TABLET UNDER THE TONGUE TWO TIMES DAILY AS NEEDED, Disp: , Rfl:     Lactobacillus (Probiotic Acidophilus) TABS, Take by mouth, Disp: , Rfl:     Magnesium 250 MG TABS, Take by mouth, Disp: , Rfl:     metoprolol tartrate (LOPRESSOR) 25 mg tablet, Take 25 mg by mouth 2 (two) times a day, Disp: , Rfl:     metoprolol tartrate (LOPRESSOR) 50 mg tablet, Take 50 mg by mouth every 12 (twelve) hours, Disp: , Rfl:     OREGANO PO, Take by mouth, Disp: , Rfl:     Probiotic Product (PROBIOTIC-10 PO), Take 1 capsule by mouth daily, Disp: , Rfl:     pyridoxine (VITAMIN B6) 100 mg tablet, Take 100 mg by mouth daily, Disp: , Rfl:     TURMERIC PO, Take by mouth, Disp: , Rfl:     benzonatate (TESSALON PERLES) 100 mg capsule, Take 1 capsule (100 mg total) by mouth 3 (three) times a day as needed for cough, Disp: 20 capsule, Rfl: 0    hyoscyamine (LEVSIN/SL) 0.125 mg SL tablet, TAKE 1 TABLET UNDER THE TONGUE TWO TIMES DAILY AS NEEDED, Disp: , Rfl:     lidocaine (XYLOCAINE) 1 %, lidocaine HCl 10 mg/mL (1 %)  injection solution  Co-injected with corticosteroid, Disp: , Rfl:     LORazepam (ATIVAN) 0.5 mg tablet, Take 0.5 mg by mouth every 6 (six) hours as needed, Disp: , Rfl:     nystatin (MYCOSTATIN) cream, APPLY TOPICALLY TWO TIMES DAILY, Disp: , Rfl:     predniSONE 5 mg tablet, TAKE 1 TABLET BY MOUTH EVERY DAY OR TWO TIMES DAILY WITH FOOD AS NEEDED FOR NERVE INFLAMMATION, Disp: , Rfl:     Sodium Hyaluronate (Euflexxa) 20 MG/2ML SOSY, 4 mL, Disp: , Rfl:   Allergies as of 02/02/2024    (No Known Allergies)     Review of Systems   All other systems reviewed and are negative.    There were no vitals filed for this visit.  Physical Exam  Constitutional:       Appearance: Normal appearance.   HENT:      Head: Normocephalic and atraumatic.   Eyes:      Extraocular Movements: Extraocular movements intact.      Pupils: Pupils are equal, round, and reactive to light.   Pulmonary:      Effort: Pulmonary effort is normal.   Musculoskeletal:         General: Normal range of motion.   Skin:     General: Skin is warm and dry.   Neurological:      General: No focal deficit present.      Mental Status: She is alert and oriented to person, place, and time.   Psychiatric:         Mood and Affect: Mood normal.         Behavior: Behavior normal.         Thought Content: Thought content normal.     Lower Endoscopy    Date/Time: 2/2/2024 2:20 PM    Performed by: Mathieu Field MD  Authorized by: Mathieu Field MD    Verbal consent obtained?: Yes    Risks and benefits: Risks, benefits and alternatives were discussed    Consent given by:  Patient  Indications: rectal bleeding    Patient sedated: No    Scope type:  Anoscope  External exam performed: Yes    Pilonidal sinus tract: No    Pilonidal cyst: No    Pilonidal tenderness: No    Perianal skin tags: Yes    Perirectal warts: No    Perianal maceration: No    Perianal induration: No    Perianal erythema: No    External hemorrhoids: No    Digital exam performed: Yes    Laxity of  anal sphincter: No    Internal hemorrhoids: No    Intraluminal mass: No    Inflammation: No    Anal fissures: No    Anal fistulae: No    Anal stricture: No    Abscess: No    Procedure termination:  Procedure complete  Patient tolerance:  Patient tolerated the procedure well with no immediate complications

## 2024-02-02 ENCOUNTER — OFFICE VISIT (OUTPATIENT)
Age: 79
End: 2024-02-02
Payer: MEDICARE

## 2024-02-02 VITALS — WEIGHT: 203 LBS | BODY MASS INDEX: 31.86 KG/M2 | HEIGHT: 67 IN

## 2024-02-02 DIAGNOSIS — K64.1 GRADE II INTERNAL HEMORRHOIDS: Primary | ICD-10-CM

## 2024-02-02 PROCEDURE — 46600 DIAGNOSTIC ANOSCOPY SPX: CPT | Performed by: COLON & RECTAL SURGERY

## 2024-02-02 PROCEDURE — 99203 OFFICE O/P NEW LOW 30 MIN: CPT | Performed by: COLON & RECTAL SURGERY

## 2024-02-02 RX ORDER — CHLORTHALIDONE 25 MG/1
12.5 TABLET ORAL DAILY
COMMUNITY
Start: 2024-01-12 | End: 2025-01-11

## 2024-07-10 ENCOUNTER — HOSPITAL ENCOUNTER (OUTPATIENT)
Dept: RADIOLOGY | Age: 79
Discharge: HOME | End: 2024-07-10
Payer: MEDICARE

## 2024-07-10 VITALS — WEIGHT: 200 LBS | HEIGHT: 66 IN | BODY MASS INDEX: 32.14 KG/M2

## 2024-07-10 DIAGNOSIS — Z12.31 ENCOUNTER FOR SCREENING MAMMOGRAM FOR BREAST CANCER: ICD-10-CM

## 2024-07-10 PROCEDURE — 77067 SCR MAMMO BI INCL CAD: CPT

## 2024-07-10 PROCEDURE — 77063 BREAST TOMOSYNTHESIS BI: CPT

## 2024-08-08 DIAGNOSIS — K21.9 GASTROESOPHAGEAL REFLUX DISEASE WITHOUT ESOPHAGITIS: ICD-10-CM

## 2024-08-08 RX ORDER — FAMOTIDINE 20 MG/1
20 TABLET, FILM COATED ORAL DAILY
Qty: 100 TABLET | Refills: 0 | Status: SHIPPED | OUTPATIENT
Start: 2024-08-08

## 2024-08-13 ENCOUNTER — TELEPHONE (OUTPATIENT)
Dept: GASTROENTEROLOGY | Facility: CLINIC | Age: 79
End: 2024-08-13

## 2024-08-13 NOTE — TELEPHONE ENCOUNTER
Called pt to schedule fu ov for medication renewal, left a message for pt to return call.  If pt calls pl;ease schedule her for soonest appt.

## 2024-08-20 ENCOUNTER — PATIENT MESSAGE (OUTPATIENT)
Dept: GASTROENTEROLOGY | Facility: CLINIC | Age: 79
End: 2024-08-20

## 2024-09-03 ENCOUNTER — TELEPHONE (OUTPATIENT)
Dept: GASTROENTEROLOGY | Facility: CLINIC | Age: 79
End: 2024-09-03

## 2024-09-03 ENCOUNTER — TELEPHONE (OUTPATIENT)
Age: 79
End: 2024-09-03

## 2024-09-03 DIAGNOSIS — K21.9 GASTROESOPHAGEAL REFLUX DISEASE WITHOUT ESOPHAGITIS: ICD-10-CM

## 2024-09-03 RX ORDER — FAMOTIDINE 20 MG/1
20 TABLET, FILM COATED ORAL DAILY
Qty: 90 TABLET | Refills: 1 | Status: SHIPPED | OUTPATIENT
Start: 2024-09-03 | End: 2025-03-02

## 2024-09-03 NOTE — TELEPHONE ENCOUNTER
Pt called stated she received a letter regarding her appt. Warm transferred to clerical team for further assistance

## 2024-11-07 DIAGNOSIS — Z78.0 MENOPAUSE: ICD-10-CM

## 2024-11-07 RX ORDER — ESTRADIOL 0.5 MG/1
TABLET ORAL
Qty: 90 TABLET | Refills: 3 | Status: SHIPPED | OUTPATIENT
Start: 2024-11-07

## 2025-01-13 NOTE — PROGRESS NOTES
Anal Excision Procedures    Performed by: Oksana Neumann MD  Authorized by: Oksana Neumann MD    A time out verifies correct patient, procedure, equipment, support staff and site/side marked as required:
Patient here for endoscopy and rubber band ligation for rectal bleeding and hemorrhoids. After informed consent was obtained which included description of procedure alternatives risk benefits poss complications could be pain bleeding infection patient was placed in the left lateral cubitus position timeout was done right before the procedure. Following that the patient was of evaluated with digital exam followed by endoscopy. Grade 2 hemorrhoids noted in the 11:00 1:00 6:00 positions. Following that the applicator was introduced and 1 rubber band was applied without difficulty in the 11 o'clock position. Patient tolerated the procedure well without any immediate complications. Impression: Status post band ligation of internal hemorrhoid at 11 o'clock position. Recommendations: Return in 2 weeks for second application.
Medical Necessity Information: It is in your best interest to select a reason for this procedure from the list below. All of these items fulfill various CMS LCD requirements except the new and changing color options.
Detail Level: Detailed
Include Z78.9 (Other Specified Conditions Influencing Health Status) As An Associated Diagnosis?: No
Medical Necessity Clause: This procedure was medically necessary because the lesions that were treated were:
Add Associated Diagnoses If Applicable When Selecting Medical Necessity: Yes
Anesthesia Volume In Cc: 3
Consent obtained and the risks of skin tag removal was reviewed with the patient including but not limited to bleeding, pigmentary change, infection, pain, and remote possibility of scarring.

## 2025-03-25 ENCOUNTER — OFFICE VISIT (OUTPATIENT)
Dept: GASTROENTEROLOGY | Facility: CLINIC | Age: 80
End: 2025-03-25
Payer: MEDICARE

## 2025-03-25 VITALS
WEIGHT: 202 LBS | DIASTOLIC BLOOD PRESSURE: 85 MMHG | HEART RATE: 65 BPM | BODY MASS INDEX: 32.47 KG/M2 | SYSTOLIC BLOOD PRESSURE: 155 MMHG | HEIGHT: 66 IN

## 2025-03-25 DIAGNOSIS — K62.5 RECTAL BLEEDING: Primary | ICD-10-CM

## 2025-03-25 DIAGNOSIS — K64.1 GRADE II INTERNAL HEMORRHOIDS: ICD-10-CM

## 2025-03-25 DIAGNOSIS — Z86.0101 HX OF ADENOMATOUS COLONIC POLYPS: ICD-10-CM

## 2025-03-25 PROCEDURE — 46221 LIGATION OF HEMORRHOID(S): CPT | Performed by: INTERNAL MEDICINE

## 2025-03-25 PROCEDURE — 99213 OFFICE O/P EST LOW 20 MIN: CPT | Performed by: INTERNAL MEDICINE

## 2025-03-25 RX ORDER — ROSUVASTATIN CALCIUM 5 MG/1
5 TABLET, COATED ORAL DAILY
COMMUNITY
Start: 2025-01-27

## 2025-03-25 NOTE — PROGRESS NOTES
Name: Linda K Frankenfield      : 1945      MRN: [id number]  Encounter Provider: Jean-Paul Vigil MD  Encounter Date: 3/25/2025   Encounter department: [facility] GASTROENTEROLOG[facility] [place] [address]  :  Assessment & Plan  Rectal bleeding  Most likely hemorrhoidal.  Banding performed in the right anterior position       Grade II internal hemorrhoids  Maintain high-fiber diet       Hx of adenomatous colonic polyps  Will be due for surveillance colonoscopy 2026           History of Present Illness   Linda K Frankenfield is a 79 y.o. female who presents with a history of internal hemorrhoids and recurrent rectal bleeding.  Has previously undergone hemorrhoidal banding most recently in .  Has had recurrent bleeding over the past year which has been quite frequent and described as bright red moderate amount.  No associated pain, significant change in bowel habit, unexplained weight loss, nausea or vomiting.  HPI    Review of Systems A complete review of systems is negative other than that noted above in the HPI.      Current Outpatient Medications   Medication Sig Dispense Refill    Ascorbic Acid (vitamin C) 1000 MG tablet Take 1,000 mg by mouth daily      betamethasone acetate-betamethasone sodium phosphate (CELESTONE) 6 (3-3) mg/mL betamethasone acetate and sodium phos 6 mg/mL suspension for injection   Co-injected with local anesthetic      Cholecalciferol (Vitamin D3) 125 MCG (5000 UT) TABS Take 5,000 Units by mouth daily      Cholecalciferol 25 MCG (1000 UT) capsule Take 1,000 Units by mouth daily      co-enzyme Q-10 30 MG capsule Take 30 mg by mouth 3 (three) times a day      COLLAGEN PO Take by mouth      ELDERBERRY PO Take by mouth      estradiol (ESTRACE) 0.5 MG tablet TAKE ONE TABLET BY MOUTH EVERY OTHER DAY 90 tablet 3    Hyoscyamine Sulfate SL 0.125 MG SUBL hyoscyamine 0.125 mg sublingual tablet   TAKE 1 TABLET UNDER THE TONGUE TWO TIMES DAILY AS NEEDED      Lactobacillus  "(Probiotic Acidophilus) TABS Take by mouth      Magnesium 250 MG TABS Take by mouth      metoprolol tartrate (LOPRESSOR) 25 mg tablet Take 25 mg by mouth 2 (two) times a day      metoprolol tartrate (LOPRESSOR) 50 mg tablet Take 50 mg by mouth every 12 (twelve) hours      OREGANO PO Take by mouth      Probiotic Product (PROBIOTIC-10 PO) Take 1 capsule by mouth daily      pyridoxine (VITAMIN B6) 100 mg tablet Take 100 mg by mouth daily      rosuvastatin (CRESTOR) 5 mg tablet Take 5 mg by mouth daily      TURMERIC PO Take by mouth      benzonatate (TESSALON PERLES) 100 mg capsule Take 1 capsule (100 mg total) by mouth 3 (three) times a day as needed for cough 20 capsule 0    chlorthalidone 25 mg tablet Take 12.5 mg by mouth daily      famotidine (PEPCID) 20 mg tablet Take 1 tablet (20 mg total) by mouth daily 90 tablet 1    hyoscyamine (LEVSIN/SL) 0.125 mg SL tablet TAKE 1 TABLET UNDER THE TONGUE TWO TIMES DAILY AS NEEDED      lidocaine (XYLOCAINE) 1 % lidocaine HCl 10 mg/mL (1 %) injection solution   Co-injected with corticosteroid      LORazepam (ATIVAN) 0.5 mg tablet Take 0.5 mg by mouth every 6 (six) hours as needed      nystatin (MYCOSTATIN) cream APPLY TOPICALLY TWO TIMES DAILY      predniSONE 5 mg tablet TAKE 1 TABLET BY MOUTH EVERY DAY OR TWO TIMES DAILY WITH FOOD AS NEEDED FOR NERVE INFLAMMATION      Sodium Hyaluronate (Euflexxa) 20 MG/2ML SOSY 4 mL       No current facility-administered medications for this visit.     Objective   /85 (BP Location: Left arm, Patient Position: Sitting, Cuff Size: Adult)   Pulse 65   Ht 5' 6\" (1.676 m)   Wt 91.6 kg (202 lb)   BMI 32.60 kg/m²     Physical Exam  Genitourinary:     Comments: No skin tags, fluctuance, erythema.  Digital rectal exam without blood, no mass.         Lower Endoscopy    Date/Time: 3/25/2025 9:20 AM    Performed by: Jean-Paul Vigil MD  Authorized by: Jean-Paul Vigil MD    Verbal consent obtained?: Yes    Scope type:  Anoscope  External exam " performed: Yes    Perianal induration: No    Perianal erythema: No    External hemorrhoids: No    Digital exam performed: Yes    Internal hemorrhoids: Yes    Abscess: No    Anal Excision Procedures    Performed by: Jean-Paul Vigil MD  Authorized by: Jean-Paul Vigil MD    Universal Protocol:     Verbal consent obtained?: Yes      Risks and benefits: Risks, benefits and alternatives were discussed      Consent given by:  Patient    Patient states understanding of procedure being performed: Yes      Patient's understanding of procedure matches consent: Yes      Procedure consent matches procedure scheduled: Yes      Relevant documents present and verified: Yes      Patient identity confirmed:  Verbally with patient  A time out verifies correct patient, procedure, equipment, support staff and site/side marked as required:   Procedure Type: hemorrhoidectomy    Hemorrhoidectomy Details:   Hemorrhoid type: internal    Number of columns/groups removed:  1  Single rubber band ligation    Patient tolerance:  Patient tolerated the procedure well with no immediate complications  Additiional Procedure Intervention Details:  Right anterior          Lab Results: I personally reviewed relevant lab results.       Results for orders placed during the hospital encounter of 01/22/21    EGD    Impression  Gastritis, question esophageal dysmotility, mild esophagitis    RECOMMENDATION:  Check biopsies, famotidine 40 mg a day, barium swallow with 13 mm tablet, office visit 2 months      Nicolas Still MD

## 2025-04-22 ENCOUNTER — OFFICE VISIT (OUTPATIENT)
Age: 80
End: 2025-04-22
Payer: MEDICARE

## 2025-04-22 VITALS
HEART RATE: 54 BPM | HEIGHT: 66 IN | SYSTOLIC BLOOD PRESSURE: 165 MMHG | DIASTOLIC BLOOD PRESSURE: 80 MMHG | BODY MASS INDEX: 32.47 KG/M2 | WEIGHT: 202 LBS

## 2025-04-22 DIAGNOSIS — K62.5 RECTAL BLEEDING: Primary | ICD-10-CM

## 2025-04-22 DIAGNOSIS — K64.1 GRADE II INTERNAL HEMORRHOIDS: ICD-10-CM

## 2025-04-22 DIAGNOSIS — Z86.0101 HX OF ADENOMATOUS COLONIC POLYPS: ICD-10-CM

## 2025-04-22 PROCEDURE — 99213 OFFICE O/P EST LOW 20 MIN: CPT | Performed by: INTERNAL MEDICINE

## 2025-04-22 PROCEDURE — 46221 LIGATION OF HEMORRHOID(S): CPT | Performed by: INTERNAL MEDICINE

## 2025-04-22 NOTE — ASSESSMENT & PLAN NOTE
We discussed options including further banding versus watchful waiting.  She would prefer the former.  Second band placed as below

## 2025-04-22 NOTE — PROGRESS NOTES
Name: Linda K Frankenfield      : 1945      MRN: [id number]  Encounter Provider: Jean-Paul Vigil MD  Encounter Date: 2025   Encounter department: [facility] [place]  :  Assessment & Plan  Rectal bleeding  Bleeding has completely resolved following hemorrhoidal banding the right anterior position       Grade II internal hemorrhoids  We discussed options including further banding versus watchful waiting.  She would prefer the former.  Second band placed as below       Hx of adenomatous colonic polyps  Repeat colonoscopy        Anal Excision Procedures    Performed by: Jean-Paul Vigil MD  Authorized by: Jean-Paul Vigil MD    Universal Protocol:     Verbal consent obtained?: Yes      Risks and benefits: Risks, benefits and alternatives were discussed      Consent given by:  Patient    Patient states understanding of procedure being performed: Yes      Patient's understanding of procedure matches consent: Yes      Procedure consent matches procedure scheduled: Yes      Relevant documents present and verified: Yes      Patient identity confirmed:  Verbally with patient  A time out verifies correct patient, procedure, equipment, support staff and site/side marked as required:   Procedure Type: hemorrhoidectomy    Hemorrhoidectomy Details:   Hemorrhoid type: internal    Number of columns/groups removed:  1  Single rubber band ligation    Patient tolerance:  Patient tolerated the procedure well with no immediate complications  Additiional Procedure Intervention Details:  Left lateral        History of Present Illness   Linda K Frankenfield is a 79 y.o. female who presents in follow-up of bleeding internal hemorrhoids.  Underwent hemorrhoidal banding in the right anterior position  reports she has had no bleeding since that time.  HPI    Review of Systems A complete review of systems is negative other than that noted above in the HPI.      Current Outpatient Medications    Medication Sig Dispense Refill    Ascorbic Acid (vitamin C) 1000 MG tablet Take 1,000 mg by mouth daily      Cholecalciferol (Vitamin D3) 125 MCG (5000 UT) TABS Take 5,000 Units by mouth daily      Cholecalciferol 25 MCG (1000 UT) capsule Take 1,000 Units by mouth daily      co-enzyme Q-10 30 MG capsule Take 30 mg by mouth 3 (three) times a day      COLLAGEN PO Take by mouth      ELDERBERRY PO Take by mouth      estradiol (ESTRACE) 0.5 MG tablet TAKE ONE TABLET BY MOUTH EVERY OTHER DAY 90 tablet 3    famotidine (PEPCID) 20 mg tablet Take 1 tablet (20 mg total) by mouth daily 90 tablet 1    Hyoscyamine Sulfate SL 0.125 MG SUBL hyoscyamine 0.125 mg sublingual tablet   TAKE 1 TABLET UNDER THE TONGUE TWO TIMES DAILY AS NEEDED      Lactobacillus (Probiotic Acidophilus) TABS Take by mouth      Magnesium 250 MG TABS Take by mouth      metoprolol tartrate (LOPRESSOR) 25 mg tablet Take 25 mg by mouth 2 (two) times a day      metoprolol tartrate (LOPRESSOR) 50 mg tablet Take 50 mg by mouth every 12 (twelve) hours      OREGANO PO Take by mouth      Probiotic Product (PROBIOTIC-10 PO) Take 1 capsule by mouth daily      pyridoxine (VITAMIN B6) 100 mg tablet Take 100 mg by mouth daily      rosuvastatin (CRESTOR) 5 mg tablet Take 5 mg by mouth daily      TURMERIC PO Take by mouth      benzonatate (TESSALON PERLES) 100 mg capsule Take 1 capsule (100 mg total) by mouth 3 (three) times a day as needed for cough 20 capsule 0    betamethasone acetate-betamethasone sodium phosphate (CELESTONE) 6 (3-3) mg/mL betamethasone acetate and sodium phos 6 mg/mL suspension for injection   Co-injected with local anesthetic (Patient not taking: Reported on 4/22/2025)      chlorthalidone 25 mg tablet Take 12.5 mg by mouth daily      hyoscyamine (LEVSIN/SL) 0.125 mg SL tablet TAKE 1 TABLET UNDER THE TONGUE TWO TIMES DAILY AS NEEDED      lidocaine (XYLOCAINE) 1 % lidocaine HCl 10 mg/mL (1 %) injection solution   Co-injected with corticosteroid       "LORazepam (ATIVAN) 0.5 mg tablet Take 0.5 mg by mouth every 6 (six) hours as needed      nystatin (MYCOSTATIN) cream APPLY TOPICALLY TWO TIMES DAILY      predniSONE 5 mg tablet TAKE 1 TABLET BY MOUTH EVERY DAY OR TWO TIMES DAILY WITH FOOD AS NEEDED FOR NERVE INFLAMMATION      Sodium Hyaluronate (Euflexxa) 20 MG/2ML SOSY 4 mL       No current facility-administered medications for this visit.     Objective   /80 (BP Location: Left arm, Patient Position: Sitting, Cuff Size: Standard)   Pulse (!) 54   Ht 5' 6\" (1.676 m)   Wt 91.6 kg (202 lb)   BMI 32.60 kg/m²     Physical Exam external anal exam unremarkable.  No skin tags, no external hemorrhoids, no erythema or fluctuance.  Digital rectal exam with no mass or blood, no stool in the vault      Lab Results: I personally reviewed relevant lab results.       Results for orders placed during the hospital encounter of 01/22/21    EGD    Impression  Gastritis, question esophageal dysmotility, mild esophagitis    RECOMMENDATION:  Check biopsies, famotidine 40 mg a day, barium swallow with 13 mm tablet, office visit 2 months      Nicolas Still MD        "

## 2025-04-23 DIAGNOSIS — K21.9 GASTROESOPHAGEAL REFLUX DISEASE WITHOUT ESOPHAGITIS: ICD-10-CM

## 2025-04-23 RX ORDER — FAMOTIDINE 20 MG/1
20 TABLET, FILM COATED ORAL DAILY
Qty: 90 TABLET | Refills: 1 | Status: SHIPPED | OUTPATIENT
Start: 2025-04-23

## 2025-05-07 ENCOUNTER — OFFICE VISIT (OUTPATIENT)
Age: 80
End: 2025-05-07
Payer: MEDICARE

## 2025-05-07 VITALS
WEIGHT: 202 LBS | BODY MASS INDEX: 32.47 KG/M2 | HEIGHT: 66 IN | HEART RATE: 66 BPM | DIASTOLIC BLOOD PRESSURE: 82 MMHG | SYSTOLIC BLOOD PRESSURE: 138 MMHG

## 2025-05-07 DIAGNOSIS — K64.1 GRADE II INTERNAL HEMORRHOIDS: Primary | ICD-10-CM

## 2025-05-07 DIAGNOSIS — Z86.0101 HX OF ADENOMATOUS COLONIC POLYPS: ICD-10-CM

## 2025-05-07 PROCEDURE — 99213 OFFICE O/P EST LOW 20 MIN: CPT | Performed by: INTERNAL MEDICINE

## 2025-05-07 PROCEDURE — 46221 LIGATION OF HEMORRHOID(S): CPT | Performed by: INTERNAL MEDICINE

## 2025-05-07 NOTE — ASSESSMENT & PLAN NOTE
Symptoms resolved.  Tolerated banding well.  Third band placed in the right posterior position

## 2025-05-07 NOTE — PROGRESS NOTES
Name: Linda K Frankenfield      : 1945      MRN: [id number]  Encounter Provider: Jean-Paul Vigil MD  Encounter Date: 2025   Encounter department: [facility] [place]  :  Assessment & Plan  Grade II internal hemorrhoids  Symptoms resolved.  Tolerated banding well.  Third band placed in the right posterior position       Hx of adenomatous colonic polyps  Surveillance due early          Anal Excision Procedures    Performed by: Jean-Paul Vigil MD  Authorized by: Jean-Paul Vigil MD    Universal Protocol:     Verbal consent obtained?: Yes      Risks and benefits: Risks, benefits and alternatives were discussed      Consent given by:  Patient    Patient states understanding of procedure being performed: Yes      Patient's understanding of procedure matches consent: Yes      Procedure consent matches procedure scheduled: Yes      Relevant documents present and verified: Yes      Patient identity confirmed:  Verbally with patient  A time out verifies correct patient, procedure, equipment, support staff and site/side marked as required:   Procedure Type: hemorrhoidectomy    Hemorrhoidectomy Details:   Hemorrhoid type: internal    Number of columns/groups removed:  1  Single rubber band ligation    Patient tolerance:  Patient tolerated the procedure well with no immediate complications  Additiional Procedure Intervention Details:  Right posterior      History of Present Illness   Linda K Frankenfield is a 79 y.o. female who presents in follow-up of bleeding grade 2 internal hemorrhoids.  Bleeding has resolved following hemorrhoidal banding x 2.      HPI    Review of Systems A complete review of systems is negative other than that noted above in the HPI.      Current Outpatient Medications   Medication Sig Dispense Refill    Ascorbic Acid (vitamin C) 1000 MG tablet Take 1,000 mg by mouth daily      Cholecalciferol (Vitamin D3) 125 MCG (5000 UT) TABS Take 5,000 Units by mouth daily       Cholecalciferol 25 MCG (1000 UT) capsule Take 1,000 Units by mouth daily      co-enzyme Q-10 30 MG capsule Take 30 mg by mouth 3 (three) times a day      COLLAGEN PO Take by mouth      ELDERBERRY PO Take by mouth      estradiol (ESTRACE) 0.5 MG tablet TAKE ONE TABLET BY MOUTH EVERY OTHER DAY 90 tablet 3    famotidine (PEPCID) 20 mg tablet TAKE 1 TABLET BY MOUTH EVERY DAY 90 tablet 1    Hyoscyamine Sulfate SL 0.125 MG SUBL hyoscyamine 0.125 mg sublingual tablet   TAKE 1 TABLET UNDER THE TONGUE TWO TIMES DAILY AS NEEDED      Lactobacillus (Probiotic Acidophilus) TABS Take by mouth      Magnesium 250 MG TABS Take by mouth      metoprolol tartrate (LOPRESSOR) 25 mg tablet Take 25 mg by mouth 2 (two) times a day      metoprolol tartrate (LOPRESSOR) 50 mg tablet Take 50 mg by mouth every 12 (twelve) hours      OREGANO PO Take by mouth      Probiotic Product (PROBIOTIC-10 PO) Take 1 capsule by mouth daily      pyridoxine (VITAMIN B6) 100 mg tablet Take 100 mg by mouth daily      rosuvastatin (CRESTOR) 5 mg tablet Take 5 mg by mouth daily      TURMERIC PO Take by mouth      benzonatate (TESSALON PERLES) 100 mg capsule Take 1 capsule (100 mg total) by mouth 3 (three) times a day as needed for cough 20 capsule 0    betamethasone acetate-betamethasone sodium phosphate (CELESTONE) 6 (3-3) mg/mL betamethasone acetate and sodium phos 6 mg/mL suspension for injection   Co-injected with local anesthetic (Patient not taking: Reported on 4/22/2025)      chlorthalidone 25 mg tablet Take 12.5 mg by mouth daily      hyoscyamine (LEVSIN/SL) 0.125 mg SL tablet TAKE 1 TABLET UNDER THE TONGUE TWO TIMES DAILY AS NEEDED      lidocaine (XYLOCAINE) 1 % lidocaine HCl 10 mg/mL (1 %) injection solution   Co-injected with corticosteroid      LORazepam (ATIVAN) 0.5 mg tablet Take 0.5 mg by mouth every 6 (six) hours as needed      nystatin (MYCOSTATIN) cream APPLY TOPICALLY TWO TIMES DAILY      predniSONE 5 mg tablet TAKE 1 TABLET BY MOUTH EVERY  "DAY OR TWO TIMES DAILY WITH FOOD AS NEEDED FOR NERVE INFLAMMATION      Sodium Hyaluronate (Euflexxa) 20 MG/2ML SOSY 4 mL       No current facility-administered medications for this visit.     Objective   /82 (BP Location: Left arm, Patient Position: Sitting, Cuff Size: Standard)   Pulse 66   Ht 5' 6\" (1.676 m)   Wt 91.6 kg (202 lb)   BMI 32.60 kg/m²     Physical Exam       Lab Results: I personally reviewed relevant lab results.       Results for orders placed during the hospital encounter of 01/22/21    EGD    Impression  Gastritis, question esophageal dysmotility, mild esophagitis    RECOMMENDATION:  Check biopsies, famotidine 40 mg a day, barium swallow with 13 mm tablet, office visit 2 months      Nicolas Still MD        "

## 2025-06-23 ENCOUNTER — TELEPHONE (OUTPATIENT)
Age: 80
End: 2025-06-23

## 2025-06-23 DIAGNOSIS — Z12.31 ENCOUNTER FOR SCREENING MAMMOGRAM FOR MALIGNANT NEOPLASM OF BREAST: Primary | ICD-10-CM

## 2025-06-23 NOTE — TELEPHONE ENCOUNTER
Patient called central scheduling today and scheduled MAMMO SCREENING BILATERAL W 3D AND CAD for 7/10/25.  Please enter an order for the mammogram. Last mammo was ordered by Dr. Velazquez.

## 2025-08-05 ENCOUNTER — HOSPITAL ENCOUNTER (OUTPATIENT)
Dept: RADIOLOGY | Facility: MEDICAL CENTER | Age: 80
Discharge: HOME | End: 2025-08-05
Attending: OBSTETRICS & GYNECOLOGY
Payer: MEDICARE

## 2025-08-05 VITALS — WEIGHT: 202 LBS | HEIGHT: 66 IN | BODY MASS INDEX: 32.47 KG/M2

## 2025-08-05 DIAGNOSIS — Z12.31 SCREENING MAMMOGRAM, ENCOUNTER FOR: ICD-10-CM

## 2025-08-05 PROCEDURE — 77063 BREAST TOMOSYNTHESIS BI: CPT

## 2025-08-05 PROCEDURE — 77067 SCR MAMMO BI INCL CAD: CPT
